# Patient Record
Sex: MALE | Race: WHITE | NOT HISPANIC OR LATINO
[De-identification: names, ages, dates, MRNs, and addresses within clinical notes are randomized per-mention and may not be internally consistent; named-entity substitution may affect disease eponyms.]

---

## 2017-01-17 ENCOUNTER — APPOINTMENT (OUTPATIENT)
Dept: SURGERY | Facility: CLINIC | Age: 67
End: 2017-01-17

## 2017-01-17 ENCOUNTER — LABORATORY RESULT (OUTPATIENT)
Age: 67
End: 2017-01-17

## 2017-01-17 DIAGNOSIS — R22.0 LOCALIZED SWELLING, MASS AND LUMP, HEAD: ICD-10-CM

## 2017-01-18 ENCOUNTER — TRANSCRIPTION ENCOUNTER (OUTPATIENT)
Age: 67
End: 2017-01-18

## 2017-01-23 ENCOUNTER — OTHER (OUTPATIENT)
Age: 67
End: 2017-01-23

## 2022-04-05 ENCOUNTER — APPOINTMENT (OUTPATIENT)
Dept: ORTHOPEDIC SURGERY | Facility: CLINIC | Age: 72
End: 2022-04-05
Payer: MEDICARE

## 2022-04-05 VITALS — WEIGHT: 213 LBS | BODY MASS INDEX: 30.49 KG/M2 | HEIGHT: 70 IN

## 2022-04-05 PROCEDURE — 99214 OFFICE O/P EST MOD 30 MIN: CPT

## 2022-04-05 NOTE — ASSESSMENT
[FreeTextEntry1] : recurrent right knee pain since early feb 2022. mild oa only. saw dr aponte and received csi on 2/28/22. aspirate shows pseudogout. mri shows large mmt, lmt. continued pain and buckling.\par \par   history of left knee scope done by me in 2010. doing okay at this time.\par  \par  *pmhx: htn, high cholesterol, thyroid dz, DM history of heart disease - s/p coronary bypass

## 2022-04-05 NOTE — REASON FOR VISIT
[FreeTextEntry2] : pt is here for a follow up visit  of his right knee. the pain in the right knee has improve. pt still taking medication for the right knee.

## 2022-04-05 NOTE — PHYSICAL EXAM
[Right] : right knee [NL (0)] : extension 0 degrees [4___] : hamstring 4[unfilled]/5 [Positive] : positive Iris [] : patient ambulates with assistive device [TWNoteComboBox7] : flexion 110 degrees

## 2022-04-05 NOTE — HISTORY OF PRESENT ILLNESS
[Gradual] : gradual [5] : 5 [6] : 6 [Intermittent] : intermittent [Rest] : rest [Sitting] : sitting [Bending forward] : bending forward [Retired] : Work status: retired [de-identified] : 4/5/22:  continued right medial knee pain with activities.  [] : no [FreeTextEntry1] : right knee

## 2022-04-18 ENCOUNTER — APPOINTMENT (OUTPATIENT)
Dept: ORTHOPEDIC SURGERY | Facility: AMBULATORY SURGERY CENTER | Age: 72
End: 2022-04-18
Payer: MEDICARE

## 2022-04-18 PROCEDURE — 29880 ARTHRS KNE SRG MNISECTMY M&L: CPT | Mod: RT

## 2022-04-18 RX ORDER — ASPIRIN/ACETAMINOPHEN/CAFFEINE 500-325-65
325 POWDER IN PACKET (EA) ORAL DAILY
Qty: 30 | Refills: 0 | Status: ACTIVE | COMMUNITY
Start: 2022-04-18 | End: 1900-01-01

## 2022-04-18 RX ORDER — OXYCODONE AND ACETAMINOPHEN 5; 325 MG/1; MG/1
5-325 TABLET ORAL EVERY 4 HOURS
Qty: 30 | Refills: 0 | Status: ACTIVE | COMMUNITY
Start: 2022-04-18 | End: 1900-01-01

## 2022-04-29 ENCOUNTER — APPOINTMENT (OUTPATIENT)
Dept: ORTHOPEDIC SURGERY | Facility: CLINIC | Age: 72
End: 2022-04-29
Payer: MEDICARE

## 2022-04-29 VITALS — HEIGHT: 70 IN | WEIGHT: 213 LBS | BODY MASS INDEX: 30.49 KG/M2

## 2022-04-29 DIAGNOSIS — Z86.79 PERSONAL HISTORY OF OTHER DISEASES OF THE CIRCULATORY SYSTEM: ICD-10-CM

## 2022-04-29 DIAGNOSIS — Z86.39 PERSONAL HISTORY OF OTHER ENDOCRINE, NUTRITIONAL AND METABOLIC DISEASE: ICD-10-CM

## 2022-04-29 PROCEDURE — 99024 POSTOP FOLLOW-UP VISIT: CPT

## 2022-04-29 RX ORDER — METOPROLOL TARTRATE 100 MG/1
100 TABLET, FILM COATED ORAL
Refills: 0 | Status: ACTIVE | COMMUNITY

## 2022-04-29 RX ORDER — ROSUVASTATIN CALCIUM 20 MG/1
20 TABLET, FILM COATED ORAL
Refills: 0 | Status: ACTIVE | COMMUNITY

## 2022-04-29 NOTE — ASSESSMENT
[FreeTextEntry1] : s/p right knee scope for pmm, plm, synovectomy, chondroplasty on 4/18/22.  oa present.  doing well.

## 2022-04-29 NOTE — DISCUSSION/SUMMARY
[de-identified] : Instructions:  Progress Note completed by Tiffanie Herrera PA-C\par * Dr. Brink -- The documentation recorded accurately reflects the decisions made by me during this visit.

## 2022-04-29 NOTE — HISTORY OF PRESENT ILLNESS
[3] : 3 [Tightness] : tightness [Retired] : Work status: retired [] : Post Surgical Visit: yes [de-identified] : 4/29/22:  s/p right knee scope on 4/18/22.  [FreeTextEntry1] : right knee [FreeTextEntry6] : numbness [de-identified] : p [de-identified] : 04/11/22

## 2022-05-25 ENCOUNTER — RX RENEWAL (OUTPATIENT)
Age: 72
End: 2022-05-25

## 2022-05-25 RX ORDER — INDOMETHACIN 50 MG/1
50 CAPSULE ORAL
Qty: 60 | Refills: 1 | Status: ACTIVE | COMMUNITY
Start: 2022-05-25 | End: 1900-01-01

## 2022-06-10 ENCOUNTER — APPOINTMENT (OUTPATIENT)
Dept: ORTHOPEDIC SURGERY | Facility: CLINIC | Age: 72
End: 2022-06-10
Payer: MEDICARE

## 2022-06-10 VITALS — WEIGHT: 213 LBS | HEIGHT: 70 IN | BODY MASS INDEX: 30.49 KG/M2

## 2022-06-10 PROCEDURE — 99024 POSTOP FOLLOW-UP VISIT: CPT

## 2022-06-10 RX ORDER — OXYCODONE AND ACETAMINOPHEN 5; 325 MG/1; MG/1
5-325 TABLET ORAL TWICE DAILY
Qty: 14 | Refills: 0 | Status: ACTIVE | COMMUNITY
Start: 2022-06-10 | End: 1900-01-01

## 2022-06-10 NOTE — DISCUSSION/SUMMARY
[de-identified] : Instructions:  Progress Note completed by Tiffanie Herrera PA-C\par * Dr. Brink -- The documentation recorded accurately reflects the decisions made by me during this visit.

## 2022-06-10 NOTE — REASON FOR VISIT
[FreeTextEntry2] : post op right knee Pain Refusal Text: I offered to prescribe pain medication but the patient refused to take this medication.

## 2022-06-10 NOTE — HISTORY OF PRESENT ILLNESS
[6] : 6 [5] : 5 [] : Post Surgical Visit: yes [de-identified] : 4/29/22:  s/p right knee scope on 4/18/22.\par 6/10/22:  right knee pain a little worse.  [FreeTextEntry1] : right knee [de-identified] : 4/18/22 [de-identified] : right knee

## 2022-07-15 ENCOUNTER — APPOINTMENT (OUTPATIENT)
Dept: ORTHOPEDIC SURGERY | Facility: CLINIC | Age: 72
End: 2022-07-15

## 2022-07-15 VITALS — WEIGHT: 213 LBS | BODY MASS INDEX: 30.49 KG/M2 | HEIGHT: 70 IN

## 2022-07-15 DIAGNOSIS — S83.241D OTHER TEAR OF MEDIAL MENISCUS, CURRENT INJURY, RIGHT KNEE, SUBSEQUENT ENCOUNTER: ICD-10-CM

## 2022-07-15 DIAGNOSIS — S83.281D OTHER TEAR OF LATERAL MENISCUS, CURRENT INJURY, RIGHT KNEE, SUBSEQUENT ENCOUNTER: ICD-10-CM

## 2022-07-15 PROCEDURE — 99024 POSTOP FOLLOW-UP VISIT: CPT

## 2022-07-15 PROCEDURE — 20610 DRAIN/INJ JOINT/BURSA W/O US: CPT | Mod: 58,RT

## 2022-07-15 PROCEDURE — 99214 OFFICE O/P EST MOD 30 MIN: CPT | Mod: 25

## 2022-07-15 NOTE — PHYSICAL EXAM
[Right] : right knee [NL (0)] : extension 0 degrees [4___] : quadriceps 4[unfilled]/5 [5___] : hamstring 5[unfilled]/5 [] : no drainage [de-identified] : nvi distally [TWNoteComboBox7] : flexion 120 degrees

## 2022-07-15 NOTE — PROCEDURE
[Large Joint Injection] : Large joint injection [Right] : of the right [Knee] : knee [Pain] : pain [Inflammation] : inflammation [Alcohol] : alcohol [Betadine] : betadine [Orthovisc] : Orthovisc [#1] : series #1 [] : Patient tolerated procedure well [Call if redness, pain or fever occur] : call if redness, pain or fever occur [Apply ice for 15min out of every hour for the next 12-24 hours as tolerated] : apply ice for 15 minutes out of every hour for the next 12-24 hours as tolerated [Patient was advised to rest the joint(s) for ____ days] : patient was advised to rest the joint(s) for [unfilled] days [Previous OTC use and PT nontherapeutic] : patient has tried OTC's including aspirin, Ibuprofen, Aleve, etc or prescription NSAIDS, and/or exercises at home and/or physical therapy without satisfactory response [Patient had decreased mobility in the joint] : patient had decreased mobility in the joint [Risks, benefits, alternatives discussed / Verbal consent obtained] : the risks benefits, and alternatives have been discussed, and verbal consent was obtained

## 2022-07-15 NOTE — HISTORY OF PRESENT ILLNESS
[Localized] : localized [Sharp] : sharp [Throbbing] : throbbing [Tightness] : tightness [Intermittent] : intermittent [6] : 6 [5] : 5 [de-identified] : 4/29/22:  s/p right knee scope on 4/18/22.\par 6/10/22:  right knee pain a little worse. \par 7/15/22:  right knee pain persists. [] : no [FreeTextEntry1] : right knee [FreeTextEntry5] : post op right knee follow up. Pt states pain is less often but when it is painful it feels worse then previously. Pt states that his knee buckled and gave out on him on 7/14/22. [de-identified] : physical therapy 2 times a week [de-identified] : 4/18/22 [de-identified] : right knee

## 2022-07-22 ENCOUNTER — APPOINTMENT (OUTPATIENT)
Dept: ORTHOPEDIC SURGERY | Facility: CLINIC | Age: 72
End: 2022-07-22

## 2022-07-22 PROCEDURE — 99212 OFFICE O/P EST SF 10 MIN: CPT | Mod: 25

## 2022-07-22 PROCEDURE — 20610 DRAIN/INJ JOINT/BURSA W/O US: CPT

## 2022-07-22 NOTE — ASSESSMENT
[FreeTextEntry1] : s/p right knee scope for pmm, plm, synovectomy, chondroplasty on 4/18/22.  oa present.  some increased pain last few days. no fevers and chills.

## 2022-07-22 NOTE — PROCEDURE
[FreeTextEntry3] : Procedure Name: ORTHOVISC (Large Joint)\par \par Viscosupplementation Injection: X-ray evidence of Osteoarthritis on this or prior visit and Patient has tried OTC's including aspirin, Ibuprofen, Aleve etc or prescription NSAIDS, and/or exercises at home and/ or physical therapy without satisfactory response.  The risks, benefits, and alternatives to Viscosupplementation injection were explained in full to the patient. Risks outlined include but are not limited to infection, sepsis, bleeding, scarring, skin discoloration, temporary increase in pain, syncopal episode, failure to resolve symptoms, allergic reaction, and symptom recurrence. Signs and symptoms of infection reviewed and patient advised to call immediately for redness, fevers, and/or chills. Patient understood the risks. All questions were answered. \par \par Oral informed consent was obtained.   Sterile technique was utilized for the procedure including the preparation of the solutions used for the injection. An injection of Orthovisc 2ml #2 was injected into RIGHT KNEE.  Patient tolerated the procedure well. Advised to ice the injection site this evening.  Post Procedure Instructions: Patient was advised to call if redness, pain, or fever occur and apply ice for 15 min. out of every hour for the next 12-24 hours as tolerated. patient was advised to rest the joint(s) for 2 days.

## 2022-07-22 NOTE — HISTORY OF PRESENT ILLNESS
[2] : 2 [Orthovisc] : Orthovisc [de-identified] : 4/29/22:  s/p right knee scope on 4/18/22.\par 6/10/22:  right knee pain a little worse. \par 7/15/22:  right knee pain persists.\par 7/22/22:  right knee pain still.  no adverse reactions from first injection.  [] : no [FreeTextEntry1] : rt knee [de-identified] : 6/22/22 [TWNoteComboBox1] : 40%

## 2022-07-22 NOTE — DISCUSSION/SUMMARY
[de-identified] : Progress note completed by Tiffanie Herrera PA-C\par *Dr. Brink - [facility] assigned on this date saw this patient independently.  I have reviewed the note and agree with the treatment provided.

## 2022-07-22 NOTE — PHYSICAL EXAM
[Right] : right knee [NL (0)] : extension 0 degrees [4___] : quadriceps 4[unfilled]/5 [5___] : hamstring 5[unfilled]/5 [] : no drainage [de-identified] : nvi distally [TWNoteComboBox7] : flexion 120 degrees

## 2022-07-28 ENCOUNTER — APPOINTMENT (OUTPATIENT)
Dept: ORTHOPEDIC SURGERY | Facility: CLINIC | Age: 72
End: 2022-07-28

## 2022-07-28 VITALS — BODY MASS INDEX: 30.49 KG/M2 | WEIGHT: 213 LBS | HEIGHT: 70 IN

## 2022-07-28 PROCEDURE — 99212 OFFICE O/P EST SF 10 MIN: CPT | Mod: 25

## 2022-07-28 PROCEDURE — 20610 DRAIN/INJ JOINT/BURSA W/O US: CPT

## 2022-07-28 NOTE — PHYSICAL EXAM
[Right] : right knee [NL (0)] : extension 0 degrees [4___] : quadriceps 4[unfilled]/5 [5___] : hamstring 5[unfilled]/5 [] : light touch is intact throughout [de-identified] : nvi distally [TWNoteComboBox7] : flexion 120 degrees

## 2022-07-28 NOTE — REASON FOR VISIT
[FreeTextEntry2] : Patient is here for a Follow Up appointment to receive Orthovisc Injection #3 in the Right Knee.

## 2022-07-28 NOTE — PROCEDURE
[FreeTextEntry3] : Procedure Name: ORTHOVISC (Large Joint)\par \par Viscosupplementation Injection: X-ray evidence of Osteoarthritis on this or prior visit and Patient has tried OTC's including aspirin, Ibuprofen, Aleve etc or prescription NSAIDS, and/or exercises at home and/ or physical therapy without satisfactory response.  The risks, benefits, and alternatives to Viscosupplementation injection were explained in full to the patient. Risks outlined include but are not limited to infection, sepsis, bleeding, scarring, skin discoloration, temporary increase in pain, syncopal episode, failure to resolve symptoms, allergic reaction, and symptom recurrence. Signs and symptoms of infection reviewed and patient advised to call immediately for redness, fevers, and/or chills. Patient understood the risks. All questions were answered. \par \par Oral informed consent was obtained.   Sterile technique was utilized for the procedure including the preparation of the solutions used for the injection. An injection of Orthovisc 2ml #3 was injected into RIGHT KNEE.  Patient tolerated the procedure well. Advised to ice the injection site this evening.  Post Procedure Instructions: Patient was advised to call if redness, pain, or fever occur and apply ice for 15 min. out of every hour for the next 12-24 hours as tolerated. patient was advised to rest the joint(s) for 2 days.

## 2022-07-28 NOTE — HISTORY OF PRESENT ILLNESS
[3] : 3 [Orthovisc] : Orthovisc [de-identified] : 4/29/22:  s/p right knee scope on 4/18/22.\par 6/10/22:  right knee pain a little worse. \par 7/15/22:  right knee pain persists.\par 7/22/22:  right knee pain still.  no adverse reactions from first injection. \par 7/28/22:  right knee pain continues.  [] : no [FreeTextEntry1] : right knee [de-identified] : 7/22/22 [TWNoteComboBox1] : 40%

## 2022-07-28 NOTE — ASSESSMENT
[FreeTextEntry1] : s/p right knee scope for pmm, plm, synovectomy, chondroplasty on 4/18/22.  oa present.

## 2022-07-28 NOTE — DISCUSSION/SUMMARY
[de-identified] : Progress note completed by Tiffanie Herrera PA-C\par *Dr. Brink - [facility] assigned on this date saw this patient independently.  I have reviewed the note and agree with the treatment provided.

## 2022-08-05 ENCOUNTER — APPOINTMENT (OUTPATIENT)
Dept: ORTHOPEDIC SURGERY | Facility: CLINIC | Age: 72
End: 2022-08-05

## 2022-08-05 VITALS — WEIGHT: 213 LBS | BODY MASS INDEX: 30.49 KG/M2 | HEIGHT: 70 IN

## 2022-08-05 PROCEDURE — 20610 DRAIN/INJ JOINT/BURSA W/O US: CPT | Mod: 50

## 2022-08-05 PROCEDURE — 73562 X-RAY EXAM OF KNEE 3: CPT | Mod: LT

## 2022-08-05 PROCEDURE — 99212 OFFICE O/P EST SF 10 MIN: CPT | Mod: 25

## 2022-08-05 NOTE — HISTORY OF PRESENT ILLNESS
[Orthovisc] : Orthovisc [3] : 3 [de-identified] : 4/29/22:  s/p right knee scope on 4/18/22.\par 6/10/22:  right knee pain a little worse. \par 7/15/22:  right knee pain persists.\par 7/22/22:  right knee pain still.  no adverse reactions from first injection. \par 7/28/22:  right knee pain continues. \par 8/5/22: follow up right knee. left knee now with some pain. [] : no [FreeTextEntry1] : right knee [de-identified] : 7/22/22 [TWNoteComboBox1] : 40%

## 2022-08-05 NOTE — PROCEDURE
[FreeTextEntry3] : Procedure Name: ORTHOVISC (Large Joint)\par \par Viscosupplementation Injection: X-ray evidence of Osteoarthritis on this or prior visit and Patient has tried OTC's including aspirin, Ibuprofen, Aleve etc or prescription NSAIDS, and/or exercises at home and/ or physical therapy without satisfactory response.  The risks, benefits, and alternatives to Viscosupplementation injection were explained in full to the patient. Risks outlined include but are not limited to infection, sepsis, bleeding, scarring, skin discoloration, temporary increase in pain, syncopal episode, failure to resolve symptoms, allergic reaction, and symptom recurrence. Signs and symptoms of infection reviewed and patient advised to call immediately for redness, fevers, and/or chills. Patient understood the risks. All questions were answered. \par \par Oral informed consent was obtained.   Sterile technique was utilized for the procedure including the preparation of the solutions used for the injection. An injection of Orthovisc 2ml #4 was injected into bilateral KNEEs.Orthovisc 2 ml #1 left knee  Patient tolerated the procedure well. Advised to ice the injection site this evening.  Post Procedure Instructions: Patient was advised to call if redness, pain, or fever occur and apply ice for 15 min. out of every hour for the next 12-24 hours as tolerated. patient was advised to rest the joint(s) for 2 days.

## 2022-08-05 NOTE — ASSESSMENT
[FreeTextEntry1] : s/p right knee scope for pmm, plm, synovectomy, chondroplasty on 4/18/22.  oa present. \par \par left knee pain with oa.

## 2022-08-05 NOTE — PHYSICAL EXAM
[Right] : right knee [NL (0)] : extension 0 degrees [4___] : quadriceps 4[unfilled]/5 [5___] : hamstring 5[unfilled]/5 [Left] : left knee [Degenerative change] : Degenerative change [] : no drainage [de-identified] : nvi distally [TWNoteComboBox7] : flexion 120 degrees

## 2022-08-12 ENCOUNTER — APPOINTMENT (OUTPATIENT)
Dept: ORTHOPEDIC SURGERY | Facility: CLINIC | Age: 72
End: 2022-08-12

## 2022-08-12 VITALS — WEIGHT: 213 LBS | BODY MASS INDEX: 30.49 KG/M2 | HEIGHT: 70 IN

## 2022-08-12 PROCEDURE — 99212 OFFICE O/P EST SF 10 MIN: CPT | Mod: 25

## 2022-08-12 PROCEDURE — 20611 DRAIN/INJ JOINT/BURSA W/US: CPT | Mod: LT

## 2022-08-12 NOTE — HISTORY OF PRESENT ILLNESS
[2] : 2 [Orthovisc] : Orthovisc [de-identified] : 4/29/22:  s/p right knee scope on 4/18/22.\par 6/10/22:  right knee pain a little worse. \par 7/15/22:  right knee pain persists.\par 7/22/22:  right knee pain still.  no adverse reactions from first injection. \par 7/28/22:  right knee pain continues. \par 8/5/22: follow up right knee. left knee now with some pain.\par 8/12/22:  left knee pain.  no adverse reactions from first injection.  [] : no [de-identified] : 08/05/22

## 2022-08-12 NOTE — PROCEDURE
[FreeTextEntry3] : Procedure Name: Orthovisc (Large Joint) with Ultrasound Guidance\par \par Viscosupplementation Injection: X-ray evidence of Osteoarthritis on this or prior visit and Patient has tried OTC's including aspirin, Ibuprofen, Aleve etc or prescription NSAIDS, and/or exercises at home and/ or physical therapy without satisfactory response.  The risks, benefits, and alternatives to Viscosupplementation injection were explained in full to the patient. Risks outlined include but are not limited to infection, sepsis, bleeding, scarring, skin discoloration, temporary increase in pain, syncopal episode, failure to resolve symptoms, allergic reaction, and symptom recurrence. Signs and symptoms of infection reviewed and patient advised to call immediately for redness, fevers, and/or chills. Patient understood the risks. All questions were answered. \par \par Oral informed consent was obtained.   Sterile technique was utilized for the procedure including the preparation of the solutions used for the injection. An injection of Orthovisc 2ml #2 was injected into LEFT KNEE.  Patient tolerated the procedure well. Advised to ice the injection site this evening.  Post Procedure Instructions: Patient was advised to call if redness, pain, or fever occur and apply ice for 15 min. out of every hour for the next 12-24 hours as tolerated. patient was advised to rest the joint(s) for 2 days. \par \par Ultrasound Extremity (09916) was used because of the following reasons: inflammation.\par Ultrasound Guidance was used for the following reasons: for accurate placement of needle into knee joint\par Diagnostic ultrasound was performed of the knee and is positive for synovitis.\par Ultrasound guided injection was performed of the knee, visualization of the needle and placement of injection was performed without complication.

## 2022-08-12 NOTE — PHYSICAL EXAM
[Right] : right knee [NL (0)] : extension 0 degrees [4___] : quadriceps 4[unfilled]/5 [5___] : hamstring 5[unfilled]/5 [] : antalgic [Left] : left knee [Degenerative change] : Degenerative change [de-identified] : nvi distally [TWNoteComboBox7] : flexion 120 degrees

## 2022-08-12 NOTE — DISCUSSION/SUMMARY
[de-identified] : Progress note completed by Tiffanie Herrera PA-C\par *Dr. Brink - [facility] assigned on this date saw this patient independently.  I have reviewed the note and agree with the treatment provided.

## 2022-08-12 NOTE — ASSESSMENT
[FreeTextEntry1] : s/p right knee scope for pmm, plm, synovectomy, chondroplasty on 4/18/22.  oa present.   finished orthovisc on 8/5/22. \par \par left knee pain with oa.

## 2022-08-19 ENCOUNTER — APPOINTMENT (OUTPATIENT)
Dept: ORTHOPEDIC SURGERY | Facility: CLINIC | Age: 72
End: 2022-08-19

## 2022-08-26 ENCOUNTER — APPOINTMENT (OUTPATIENT)
Dept: ORTHOPEDIC SURGERY | Facility: CLINIC | Age: 72
End: 2022-08-26

## 2022-08-26 VITALS — HEIGHT: 70 IN | WEIGHT: 213 LBS | BODY MASS INDEX: 30.49 KG/M2

## 2022-08-26 PROCEDURE — 20610 DRAIN/INJ JOINT/BURSA W/O US: CPT | Mod: LT

## 2022-08-26 PROCEDURE — 99212 OFFICE O/P EST SF 10 MIN: CPT | Mod: 25

## 2022-08-26 NOTE — DISCUSSION/SUMMARY
[de-identified] : Progress note completed by Tiffanie Herrera PA-C\par *Dr. Brink - [facility] assigned on this date saw this patient independently.  I have reviewed the note and agree with the treatment provided.

## 2022-08-26 NOTE — PROCEDURE
[FreeTextEntry3] : Procedure Name: Orthovisc (Large Joint) with Ultrasound Guidance\par \par Viscosupplementation Injection: X-ray evidence of Osteoarthritis on this or prior visit and Patient has tried OTC's including aspirin, Ibuprofen, Aleve etc or prescription NSAIDS, and/or exercises at home and/ or physical therapy without satisfactory response.  The risks, benefits, and alternatives to Viscosupplementation injection were explained in full to the patient. Risks outlined include but are not limited to infection, sepsis, bleeding, scarring, skin discoloration, temporary increase in pain, syncopal episode, failure to resolve symptoms, allergic reaction, and symptom recurrence. Signs and symptoms of infection reviewed and patient advised to call immediately for redness, fevers, and/or chills. Patient understood the risks. All questions were answered. \par \par Oral informed consent was obtained.   Sterile technique was utilized for the procedure including the preparation of the solutions used for the injection. An injection of Orthovisc 2ml #3 was injected into LEFT KNEE.  Patient tolerated the procedure well. Advised to ice the injection site this evening.  Post Procedure Instructions: Patient was advised to call if redness, pain, or fever occur and apply ice for 15 min. out of every hour for the next 12-24 hours as tolerated. patient was advised to rest the joint(s) for 2 days. \par \par Ultrasound Extremity (32060) was used because of the following reasons: inflammation.\par Ultrasound Guidance was used for the following reasons: for accurate placement of needle into knee joint\par Diagnostic ultrasound was performed of the knee and is positive for synovitis.\par Ultrasound guided injection was performed of the knee, visualization of the needle and placement of injection was performed without complication.

## 2022-08-26 NOTE — HISTORY OF PRESENT ILLNESS
[3] : 3 [Orthovisc] : Orthovisc [de-identified] : 4/29/22:  s/p right knee scope on 4/18/22.\par 6/10/22:  right knee pain a little worse. \par 7/15/22:  right knee pain persists.\par 7/22/22:  right knee pain still.  no adverse reactions from first injection. \par 7/28/22:  right knee pain continues. \par 8/5/22: follow up right knee. left knee now with some pain.\par 8/12/22:  left knee pain.  no adverse reactions from first injection. \par 8/26/22:  left knee pain continues.  [] : no [FreeTextEntry1] : left knee [de-identified] : 08/12/22 [TWNoteComboBox1] : 90%

## 2022-08-26 NOTE — PHYSICAL EXAM
[Right] : right knee [NL (0)] : extension 0 degrees [4___] : quadriceps 4[unfilled]/5 [5___] : hamstring 5[unfilled]/5 [] : antalgic [Left] : left knee [Degenerative change] : Degenerative change [de-identified] : nvi distally [TWNoteComboBox7] : flexion 120 degrees

## 2022-09-02 ENCOUNTER — APPOINTMENT (OUTPATIENT)
Dept: ORTHOPEDIC SURGERY | Facility: CLINIC | Age: 72
End: 2022-09-02

## 2022-09-02 VITALS — WEIGHT: 213 LBS | HEIGHT: 70 IN | BODY MASS INDEX: 30.49 KG/M2

## 2022-09-02 PROCEDURE — 99212 OFFICE O/P EST SF 10 MIN: CPT | Mod: 25

## 2022-09-02 PROCEDURE — 20611 DRAIN/INJ JOINT/BURSA W/US: CPT | Mod: LT

## 2022-09-02 NOTE — HISTORY OF PRESENT ILLNESS
[4] : 4 [Orthovisc] : Orthovisc [de-identified] : 4/29/22:  s/p right knee scope on 4/18/22.\par 6/10/22:  right knee pain a little worse. \par 7/15/22:  right knee pain persists.\par 7/22/22:  right knee pain still.  no adverse reactions from first injection. \par 7/28/22:  right knee pain continues. \par 8/5/22: follow up right knee. left knee now with some pain.\par 8/12/22:  left knee pain.  no adverse reactions from first injection. \par 8/26/22:  left knee pain continues. \par 9/2/22:  left knee pain still.  [] : no [FreeTextEntry1] : left knee [de-identified] : 08/26/22 [TWNoteComboBox1] : 80%

## 2022-09-02 NOTE — PROCEDURE
[FreeTextEntry3] : Procedure Name: Orthovisc (Large Joint) with Ultrasound Guidance\par \par Viscosupplementation Injection: X-ray evidence of Osteoarthritis on this or prior visit and Patient has tried OTC's including aspirin, Ibuprofen, Aleve etc or prescription NSAIDS, and/or exercises at home and/ or physical therapy without satisfactory response.  The risks, benefits, and alternatives to Viscosupplementation injection were explained in full to the patient. Risks outlined include but are not limited to infection, sepsis, bleeding, scarring, skin discoloration, temporary increase in pain, syncopal episode, failure to resolve symptoms, allergic reaction, and symptom recurrence. Signs and symptoms of infection reviewed and patient advised to call immediately for redness, fevers, and/or chills. Patient understood the risks. All questions were answered. \par \par Oral informed consent was obtained.   Sterile technique was utilized for the procedure including the preparation of the solutions used for the injection. An injection of Orthovisc 2ml #4 was injected into LEFT KNEE.  Patient tolerated the procedure well. Advised to ice the injection site this evening.  Post Procedure Instructions: Patient was advised to call if redness, pain, or fever occur and apply ice for 15 min. out of every hour for the next 12-24 hours as tolerated. patient was advised to rest the joint(s) for 2 days. \par \par Ultrasound Extremity (05732) was used because of the following reasons: inflammation.\par Ultrasound Guidance was used for the following reasons: for accurate placement of needle into knee joint\par Diagnostic ultrasound was performed of the knee and is positive for synovitis.\par Ultrasound guided injection was performed of the knee, visualization of the needle and placement of injection was performed without complication.

## 2022-09-02 NOTE — DISCUSSION/SUMMARY
[de-identified] : Progress note completed by Tiffanie Herrera PA-C\par *Dr. Brink - [facility] assigned on this date saw this patient independently.  I have reviewed the note and agree with the treatment provided.

## 2022-09-02 NOTE — PHYSICAL EXAM
[Right] : right knee [NL (0)] : extension 0 degrees [4___] : quadriceps 4[unfilled]/5 [5___] : hamstring 5[unfilled]/5 [Left] : left knee [Degenerative change] : Degenerative change [] : no drainage [de-identified] : nvi distally [TWNoteComboBox7] : flexion 120 degrees

## 2023-07-13 ENCOUNTER — APPOINTMENT (OUTPATIENT)
Dept: ORTHOPEDIC SURGERY | Facility: CLINIC | Age: 73
End: 2023-07-13
Payer: MEDICARE

## 2023-07-13 VITALS — BODY MASS INDEX: 30.49 KG/M2 | WEIGHT: 213 LBS | HEIGHT: 70 IN

## 2023-07-13 DIAGNOSIS — S16.1XXA STRAIN OF MUSCLE, FASCIA AND TENDON AT NECK LEVEL, INITIAL ENCOUNTER: ICD-10-CM

## 2023-07-13 DIAGNOSIS — M62.838 OTHER MUSCLE SPASM: ICD-10-CM

## 2023-07-13 PROCEDURE — 73562 X-RAY EXAM OF KNEE 3: CPT | Mod: 50

## 2023-07-13 PROCEDURE — 20610 DRAIN/INJ JOINT/BURSA W/O US: CPT | Mod: 59,LT

## 2023-07-13 PROCEDURE — J3490M: CUSTOM | Mod: NC

## 2023-07-13 PROCEDURE — 73010 X-RAY EXAM OF SHOULDER BLADE: CPT | Mod: LT

## 2023-07-13 PROCEDURE — 73030 X-RAY EXAM OF SHOULDER: CPT | Mod: LT

## 2023-07-13 PROCEDURE — 99214 OFFICE O/P EST MOD 30 MIN: CPT | Mod: 25

## 2023-07-13 PROCEDURE — 72040 X-RAY EXAM NECK SPINE 2-3 VW: CPT

## 2023-07-13 NOTE — DISCUSSION/SUMMARY
[de-identified] : Progress Note completed by Tiffanie Herrera PA-C\par * Dr. Brink -- The documentation recorded in this note accurately reflects the decisions made by me during this visit.

## 2023-07-13 NOTE — HISTORY OF PRESENT ILLNESS
[10] : 10 [7] : 7 [Dull/Aching] : dull/aching [Radiating] : radiating [Shooting] : shooting [Throbbing] : throbbing [Constant] : constant [de-identified] : 7/13/23:  new onset of left shoulder, shoulder blade and neck pain since 7/8/23 after hoisting meaghan.  also recurrent bilateral knee pain.  no new injury.  [] : no [FreeTextEntry1] : bilateral knee, left shoulder  [FreeTextEntry7] : from the armpit down for the left shoulder  [FreeTextEntry9] : gel injections

## 2023-07-13 NOTE — PROCEDURE
[FreeTextEntry3] : Procedure Name: Large Joint Injection / Aspiration: Celestone, Lidocaine and Marcaine \par \par Large Joint Injection was performed because of pain and inflammation. Anesthesia: ethyl chloride sprayed topically.\par Celestone: An injection of Celestone 6 mg , 1 cc. \par Lidocaine 1%: 3 cc.\par Marcaine 0.25%:  3 cc.\par \par Patient has tried OTC's including aspirin, Ibuprofen, Aleve etc or prescription NSAIDS, and/or exercises at home and/ or physical therapy without satisfactory response and Patient has decreased mobility in the joint. The risks, benefits, and alternatives to cortisone injection were explained in full to the patient. Risks outlined include but are not limited to infection, sepsis, bleeding, scarring, skin discoloration, temporary increase in pain, syncopal episode, failure to resolve symptoms, allergic reaction, symptom recurrence, and elevation of blood sugar in diabetics. Patient understood the risks. All questions were answered.   Oral informed consent was obtained.   Sterile technique was utilized for the procedure including the preparation of the solutions used for the injection. Medication was injected into the LEFT SUBACROMIAL SPACE.   Patient tolerated the procedure well. Advised to ice the injection site this evening.  Post Procedure Instructions: Patient was advised to call if redness, pain, or fever occur and apply ice for 15 min. out of every hour for the next 12-24 hours as tolerated. patient was advised to rest the joint(s) for 2 days. \par \par Procedure Name: ORTHOVISC (Large Joint)\par \par Viscosupplementation Injection: X-ray evidence of Osteoarthritis on this or prior visit and Patient has tried OTC's including aspirin, Ibuprofen, Aleve etc or prescription NSAIDS, and/or exercises at home and/ or physical therapy without satisfactory response.  The risks, benefits, and alternatives to Viscosupplementation injection were explained in full to the patient. Risks outlined include but are not limited to infection, sepsis, bleeding, scarring, skin discoloration, temporary increase in pain, syncopal episode, failure to resolve symptoms, allergic reaction, and symptom recurrence. Signs and symptoms of infection reviewed and patient advised to call immediately for redness, fevers, and/or chills. Patient understood the risks. All questions were answered. \par \par Oral informed consent was obtained.   Sterile technique was utilized for the procedure including the preparation of the solutions used for the injection. An injection of Orthovisc 2ml #1 was injected into BILATERAL KNEES.  Patient tolerated the procedure well. Advised to ice the injection site this evening.  Post Procedure Instructions: Patient was advised to call if redness, pain, or fever occur and apply ice for 15 min. out of every hour for the next 12-24 hours as tolerated. patient was advised to rest the joint(s) for 2 days.

## 2023-07-13 NOTE — ASSESSMENT
[FreeTextEntry1] : new onset of left shoulder, shoulder blade and neck pain since 7/8/23 after hoisting meaghan.   posterior shoulder pain.  likely bursitis, cuff tendonitis.  \par \par also neck stiffness with left trap pain.  occ left arm radicular symptoms.  likely muscular. \par \par s/p right knee scope for pmm, plm, synovectomy, chondroplasty on 4/18/22.  oa present.   finished orthovisc on 8/5/22. recurrent pain.  csi on 9/2/23. \par \par left knee pain with oa.  recurrent pain.  csi on 9/2/23.

## 2023-07-13 NOTE — PHYSICAL EXAM
[Right] : right knee [NL (0)] : extension 0 degrees [5___] : hamstring 5[unfilled]/5 [4 ___] : forward flexion 4[unfilled]/5 [4___] : internal rotation 4[unfilled]/5 [Equivocal] : equivocal Iris [Facet arthropathy] : Facet arthropathy [Disc space narrowing] : Disc space narrowing [There are no fractures, subluxations or dislocations. No significant abnormalities are seen] : There are no fractures, subluxations or dislocations. No significant abnormalities are seen [FreeTextEntry8] : ttp scapula [de-identified] : active abduction 140 degrees [TWNoteComboBox6] : internal rotation L5 [de-identified] : external rotation 50 degrees [] : no erythema [Left] : left knee [Degenerative change] : Degenerative change [de-identified] : nvi distally [TWNoteComboBox7] : flexion 120 degrees

## 2023-07-20 ENCOUNTER — APPOINTMENT (OUTPATIENT)
Dept: ORTHOPEDIC SURGERY | Facility: CLINIC | Age: 73
End: 2023-07-20
Payer: MEDICARE

## 2023-07-20 VITALS — BODY MASS INDEX: 30.49 KG/M2 | WEIGHT: 213 LBS | HEIGHT: 70 IN

## 2023-07-20 DIAGNOSIS — S46.912A STRAIN OF UNSPECIFIED MUSCLE, FASCIA AND TENDON AT SHOULDER AND UPPER ARM LEVEL, LEFT ARM, INITIAL ENCOUNTER: ICD-10-CM

## 2023-07-20 PROCEDURE — 99212 OFFICE O/P EST SF 10 MIN: CPT | Mod: 25

## 2023-07-20 PROCEDURE — 20610 DRAIN/INJ JOINT/BURSA W/O US: CPT | Mod: 50

## 2023-07-20 NOTE — PHYSICAL EXAM
[Facet arthropathy] : Facet arthropathy [Disc space narrowing] : Disc space narrowing [4 ___] : forward flexion 4[unfilled]/5 [There are no fractures, subluxations or dislocations. No significant abnormalities are seen] : There are no fractures, subluxations or dislocations. No significant abnormalities are seen [FreeTextEntry8] : ttp scapula [de-identified] : active abduction 140 degrees [TWNoteComboBox6] : internal rotation L5 [de-identified] : external rotation 50 degrees [Right] : right knee [NL (0)] : extension 0 degrees [4___] : quadriceps 4[unfilled]/5 [5___] : hamstring 5[unfilled]/5 [Equivocal] : equivocal Iris [] : antalgic [Left] : left knee [Degenerative change] : Degenerative change [de-identified] : nvi distally [TWNoteComboBox7] : flexion 120 degrees

## 2023-07-20 NOTE — ASSESSMENT
[FreeTextEntry1] : new onset of left shoulder, shoulder blade and neck pain since 7/8/23 after hoisting meaghan.   posterior shoulder pain.  likely bursitis, cuff tendonitis.  csi 7/13/23:  \par \par also neck stiffness with left trap pain.  occ left arm radicular symptoms.  likely muscular. \par \par s/p right knee scope for pmm, plm, synovectomy, chondroplasty on 4/18/22.  oa present.   finished orthovisc on 8/5/22. recurrent pain.  csi on 9/2/23. \par \par left knee pain with oa.  recurrent pain.  csi on 9/2/23.

## 2023-07-20 NOTE — HISTORY OF PRESENT ILLNESS
[] : This patient has had an injection before: yes [2] : 2 [Orthovisc] : Orthovisc [10] : 10 [8] : 8 [de-identified] : 7/13/23:  new onset of left shoulder, shoulder blade and neck pain since 7/8/23 after hoisting meaghan.  also recurrent bilateral knee pain.  no new injury. \par 7/20/23:  left shoulder pain still despite csi last visit.   [FreeTextEntry1] :  bilateral knees. left shoulder [de-identified] : 07/13/23

## 2023-07-20 NOTE — DISCUSSION/SUMMARY
[de-identified] : Progress Note completed by Tiffanie Herrera PA-C\par * Dr. Brink -- The documentation recorded in this note accurately reflects the decisions made by me during this visit.

## 2023-08-10 ENCOUNTER — APPOINTMENT (OUTPATIENT)
Dept: ORTHOPEDIC SURGERY | Facility: CLINIC | Age: 73
End: 2023-08-10
Payer: MEDICARE

## 2023-08-10 VITALS — WEIGHT: 213 LBS | BODY MASS INDEX: 30.49 KG/M2 | HEIGHT: 70 IN

## 2023-08-10 PROCEDURE — 20610 DRAIN/INJ JOINT/BURSA W/O US: CPT | Mod: 50

## 2023-08-10 PROCEDURE — 99212 OFFICE O/P EST SF 10 MIN: CPT | Mod: 25

## 2023-08-10 NOTE — DISCUSSION/SUMMARY
[de-identified] : injections as below. follow up in 1 week.   Progress note completed by Tiffanie Herrera PA-C *Dr. Brink - The [facility] assigned on this date is under my supervision and saw this patient independently.  I have reviewed the note/plan and agree with the treatment provided.

## 2023-08-10 NOTE — HISTORY OF PRESENT ILLNESS
[] : This patient has had an injection before: yes [3] : 3 [Orthovisc] : Orthovisc [de-identified] : 7/13/23:  new onset of left shoulder, shoulder blade and neck pain since 7/8/23 after hoisting meaghan.  also recurrent bilateral knee pain.  no new injury.  7/20/23:  left shoulder pain still despite csi last visit.   8/10/23:  bilateral knee pain continues.  [FreeTextEntry1] : bilateral knees  [de-identified] : 07/20/23

## 2023-08-10 NOTE — PROCEDURE
[FreeTextEntry3] : Procedure Name: ORTHOVISC (Large Joint)  Viscosupplementation Injection: X-ray evidence of Osteoarthritis on this or prior visit and Patient has tried OTC's including aspirin, Ibuprofen, Aleve etc or prescription NSAIDS, and/or exercises at home and/ or physical therapy without satisfactory response.  The risks, benefits, and alternatives to Viscosupplementation injection were explained in full to the patient. Risks outlined include but are not limited to infection, sepsis, bleeding, scarring, skin discoloration, temporary increase in pain, syncopal episode, failure to resolve symptoms, allergic reaction, and symptom recurrence. Signs and symptoms of infection reviewed and patient advised to call immediately for redness, fevers, and/or chills. Patient understood the risks. All questions were answered.   Oral informed consent was obtained.   Sterile technique was utilized for the procedure including the preparation of the solutions used for the injection. An injection of Orthovisc 2ml #3 was injected into BILATERAL KNEES.  Patient tolerated the procedure well. Advised to ice the injection site this evening.  Post Procedure Instructions: Patient was advised to call if redness, pain, or fever occur and apply ice for 15 min. out of every hour for the next 12-24 hours as tolerated. patient was advised to rest the joint(s) for 2 days.

## 2023-08-10 NOTE — PHYSICAL EXAM
[Facet arthropathy] : Facet arthropathy [Disc space narrowing] : Disc space narrowing [4 ___] : forward flexion 4[unfilled]/5 [There are no fractures, subluxations or dislocations. No significant abnormalities are seen] : There are no fractures, subluxations or dislocations. No significant abnormalities are seen [FreeTextEntry8] : ttp scapula [de-identified] : active abduction 140 degrees [TWNoteComboBox6] : internal rotation L5 [de-identified] : external rotation 50 degrees [Right] : right knee [NL (0)] : extension 0 degrees [4___] : quadriceps 4[unfilled]/5 [5___] : hamstring 5[unfilled]/5 [Equivocal] : equivocal Iris [] : antalgic [Left] : left knee [Degenerative change] : Degenerative change [de-identified] : nvi distally [TWNoteComboBox7] : flexion 120 degrees

## 2023-08-17 ENCOUNTER — APPOINTMENT (OUTPATIENT)
Dept: ORTHOPEDIC SURGERY | Facility: CLINIC | Age: 73
End: 2023-08-17
Payer: MEDICARE

## 2023-08-17 VITALS — WEIGHT: 213 LBS | BODY MASS INDEX: 30.49 KG/M2 | HEIGHT: 70 IN

## 2023-08-17 PROCEDURE — 99212 OFFICE O/P EST SF 10 MIN: CPT | Mod: 25

## 2023-08-17 PROCEDURE — 20610 DRAIN/INJ JOINT/BURSA W/O US: CPT | Mod: 50

## 2023-08-17 NOTE — HISTORY OF PRESENT ILLNESS
[] : This patient has had an injection before: yes [4] : 4 [Orthovisc] : Orthovisc [de-identified] : 7/13/23:  new onset of left shoulder, shoulder blade and neck pain since 7/8/23 after hoisting meaghan.  also recurrent bilateral knee pain.  no new injury.  7/20/23:  left shoulder pain still despite csi last visit.   8/10/23:  bilateral knee pain continues.  8/17/23:  bilateral knee pain persists.  [FreeTextEntry1] : bilateral knee [de-identified] : 08/10/23

## 2023-08-17 NOTE — ASSESSMENT
[FreeTextEntry1] : new onset of left shoulder, shoulder blade and neck pain since 7/8/23 after hoisting meaghan.   posterior shoulder pain.  likely bursitis, cuff tendonitis.  csi 7/13/23:    also neck stiffness with left trap pain.  occ left arm radicular symptoms.  likely muscular.   s/p right knee scope for pmm, plm, synovectomy, chondroplasty on 4/18/22.  oa present.   finished orthovisc on 8/5/22. recurrent pain.  csi on 9/2/23.   left knee pain with oa.  recurrent pain.  csi on 9/2/23.

## 2023-08-17 NOTE — PROCEDURE
[FreeTextEntry3] : Procedure Name: ORTHOVISC (Large Joint)  Viscosupplementation Injection: X-ray evidence of Osteoarthritis on this or prior visit and Patient has tried OTC's including aspirin, Ibuprofen, Aleve etc or prescription NSAIDS, and/or exercises at home and/ or physical therapy without satisfactory response.  The risks, benefits, and alternatives to Viscosupplementation injection were explained in full to the patient. Risks outlined include but are not limited to infection, sepsis, bleeding, scarring, skin discoloration, temporary increase in pain, syncopal episode, failure to resolve symptoms, allergic reaction, and symptom recurrence. Signs and symptoms of infection reviewed and patient advised to call immediately for redness, fevers, and/or chills. Patient understood the risks. All questions were answered.   Oral informed consent was obtained.   Sterile technique was utilized for the procedure including the preparation of the solutions used for the injection. An injection of Orthovisc 2ml #4 was injected into BILATERAL KNEES.  Patient tolerated the procedure well. Advised to ice the injection site this evening.  Post Procedure Instructions: Patient was advised to call if redness, pain, or fever occur and apply ice for 15 min. out of every hour for the next 12-24 hours as tolerated. patient was advised to rest the joint(s) for 2 days.

## 2023-08-17 NOTE — DISCUSSION/SUMMARY
[de-identified] : injections as below. follow up in 6 weeks.   Progress note completed by Tiffanie Herrera PA-C *Dr. Brink - [facility] assigned on this date is under my supervision and saw this patient independently.  I have reviewed the note/plan and agree with the treatment provided.

## 2023-08-17 NOTE — PHYSICAL EXAM
[Facet arthropathy] : Facet arthropathy [Disc space narrowing] : Disc space narrowing [4 ___] : forward flexion 4[unfilled]/5 [There are no fractures, subluxations or dislocations. No significant abnormalities are seen] : There are no fractures, subluxations or dislocations. No significant abnormalities are seen [FreeTextEntry8] : ttp scapula [de-identified] : active abduction 140 degrees [TWNoteComboBox6] : internal rotation L5 [de-identified] : external rotation 50 degrees [Right] : right knee [NL (0)] : extension 0 degrees [4___] : quadriceps 4[unfilled]/5 [5___] : hamstring 5[unfilled]/5 [Equivocal] : equivocal Iris [] : antalgic [Left] : left knee [Degenerative change] : Degenerative change [de-identified] : nvi distally [TWNoteComboBox7] : flexion 120 degrees

## 2024-03-22 ENCOUNTER — APPOINTMENT (OUTPATIENT)
Dept: ORTHOPEDIC SURGERY | Facility: CLINIC | Age: 74
End: 2024-03-22
Payer: MEDICARE

## 2024-03-22 PROCEDURE — 99214 OFFICE O/P EST MOD 30 MIN: CPT | Mod: 25

## 2024-03-22 PROCEDURE — J3490M: CUSTOM | Mod: NC

## 2024-03-22 PROCEDURE — 20610 DRAIN/INJ JOINT/BURSA W/O US: CPT | Mod: 50

## 2024-03-22 NOTE — ASSESSMENT
[FreeTextEntry1] : left shoulder, shoulder blade and neck pain since 7/8/23 after hoisting meaghan.   neck stiffness with left trap pain.  occ left arm radicular symptoms.    s/p right knee scope for pmm, plm, synovectomy, chondroplasty on 4/18/22.  oa present.  success with orthovisc in past.  left knee pain with oa.  success with orthovisc in past.

## 2024-03-22 NOTE — HISTORY OF PRESENT ILLNESS
[1] : 2 [0] : 0 [de-identified] : 7/13/23:  new onset of left shoulder, shoulder blade and neck pain since 7/8/23 after hoisting meaghan.  also recurrent bilateral knee pain.  no new injury.  7/20/23:  left shoulder pain still despite csi last visit.   8/10/23:  bilateral knee pain continues.  8/17/23:  bilateral knee pain persists.  3/22/24: bilateral knee pain returned about 1 month ago. no injury. advil prn.  [FreeTextEntry1] : bilateral knee [FreeTextEntry5] : right knee pain improving. left knee pain is increasing.  [de-identified] : 08/10/23

## 2024-03-22 NOTE — PROCEDURE
[Large Joint Injection] : Large joint injection [Bilateral] : bilaterally of the [Pain] : pain [Alcohol] : alcohol [Betadine] : betadine [___ cc    3mg] :  Betamethasone (Celestone) ~Vcc of 3mg [___ cc    1%] : Lidocaine ~Vcc of 1%  [] : Patient tolerated procedure well [Call if redness, pain or fever occur] : call if redness, pain or fever occur [Apply ice for 15min out of every hour for the next 12-24 hours as tolerated] : apply ice for 15 minutes out of every hour for the next 12-24 hours as tolerated [Previous OTC use and PT nontherapeutic] : patient has tried OTC's including aspirin, Ibuprofen, Aleve, etc or prescription NSAIDS, and/or exercises at home and/or physical therapy without satisfactory response [Patient was advised to rest the joint(s) for ____ days] : patient was advised to rest the joint(s) for [unfilled] days [Patient had decreased mobility in the joint] : patient had decreased mobility in the joint [Risks, benefits, alternatives discussed / Verbal consent obtained] : the risks benefits, and alternatives have been discussed, and verbal consent was obtained [All ultrasound images have been permanently captured and stored accordingly in our picture archiving and communication system] : All ultrasound images have been permanently captured and stored accordingly in our picture archiving and communication system [Visualization of the needle and placement of injection was performed without complication] : visualization of the needle and placement of injection was performed without complication [Inflammation] : inflammation [de-identified] : for accurate placement of needle into knee joint

## 2024-03-22 NOTE — PHYSICAL EXAM
[FreeTextEntry8] : ttp scapula [de-identified] : active abduction 140 degrees [TWNoteComboBox6] : internal rotation L5 [de-identified] : external rotation 50 degrees [] : mild effusion [de-identified] : nvi distally [TWNoteComboBox7] : flexion 130 degrees

## 2024-04-12 ENCOUNTER — APPOINTMENT (OUTPATIENT)
Dept: ORTHOPEDIC SURGERY | Facility: CLINIC | Age: 74
End: 2024-04-12
Payer: MEDICARE

## 2024-04-12 PROCEDURE — 20610 DRAIN/INJ JOINT/BURSA W/O US: CPT | Mod: 50

## 2024-04-12 PROCEDURE — 99214 OFFICE O/P EST MOD 30 MIN: CPT | Mod: 25

## 2024-04-12 NOTE — PHYSICAL EXAM
[Facet arthropathy] : Facet arthropathy [Disc space narrowing] : Disc space narrowing [4 ___] : forward flexion 4[unfilled]/5 [There are no fractures, subluxations or dislocations. No significant abnormalities are seen] : There are no fractures, subluxations or dislocations. No significant abnormalities are seen [Right] : right knee [Left] : left knee [NL (0)] : extension 0 degrees [4___] : quadriceps 4[unfilled]/5 [5___] : hamstring 5[unfilled]/5 [Equivocal] : equivocal Iris [FreeTextEntry8] : ttp scapula [de-identified] : active abduction 140 degrees [TWNoteComboBox6] : internal rotation L5 [de-identified] : external rotation 50 degrees [] : no erythema [de-identified] : nvi distally [TWNoteComboBox7] : flexion 130 degrees

## 2024-04-12 NOTE — PROCEDURE
[FreeTextEntry3] : Procedure Name: ORTHOVISC (Large Joint)  Viscosupplementation Injection: X-ray evidence of Osteoarthritis on this or prior visit and Patient has tried OTC's including aspirin, Ibuprofen, Aleve etc or prescription NSAIDS, and/or exercises at home and/ or physical therapy without satisfactory response.  The risks, benefits, and alternatives to Viscosupplementation injection were explained in full to the patient. Risks outlined include but are not limited to infection, sepsis, bleeding, scarring, skin discoloration, temporary increase in pain, syncopal episode, failure to resolve symptoms, allergic reaction, and symptom recurrence. Signs and symptoms of infection reviewed and patient advised to call immediately for redness, fevers, and/or chills. Patient understood the risks. All questions were answered.   Oral informed consent was obtained.   Sterile technique was utilized for the procedure including the preparation of the solutions used for the injection. An injection of Orthovisc 2ml #1 was injected into BILATERAL KNEES.  Patient tolerated the procedure well. Advised to ice the injection site this evening.  Post Procedure Instructions: Patient was advised to call if redness, pain, or fever occur and apply ice for 15 min. out of every hour for the next 12-24 hours as tolerated. patient was advised to rest the joint(s) for 2 days.

## 2024-04-12 NOTE — DISCUSSION/SUMMARY
[de-identified] : injections as below. follow up in 1 week.   Progress note completed by Tiffanie Herrera PA-C *Dr. Brink - [facility] assigned on this date is under my supervision and saw this patient independently.  I have reviewed the note and agree with the treatment provided.

## 2024-04-12 NOTE — HISTORY OF PRESENT ILLNESS
[Gradual] : gradual [0] : 0 [Stabbing] : stabbing [Intermittent] : intermittent [Rest] : rest [Exercising] : exercising [1] : 1 [Orthovisc] : Orthovisc [de-identified] : 7/13/23:  new onset of left shoulder, shoulder blade and neck pain since 7/8/23 after hoisting meaghan.  also recurrent bilateral knee pain.  no new injury.  7/20/23:  left shoulder pain still despite csi last visit.   8/10/23:  bilateral knee pain continues.  8/17/23:  bilateral knee pain persists.  3/22/24: bilateral knee pain returned about 1 month ago. no injury. advil prn.  4/12/24:  bilateral knee pain continues.  [] : no [FreeTextEntry1] : bilateral knee [FreeTextEntry5] : right knee pain improving. left knee pain is increasing.

## 2024-04-19 ENCOUNTER — APPOINTMENT (OUTPATIENT)
Dept: ORTHOPEDIC SURGERY | Facility: CLINIC | Age: 74
End: 2024-04-19
Payer: MEDICARE

## 2024-04-19 VITALS — HEIGHT: 70 IN | BODY MASS INDEX: 30.49 KG/M2 | WEIGHT: 213 LBS

## 2024-04-19 PROCEDURE — 20611 DRAIN/INJ JOINT/BURSA W/US: CPT | Mod: 50

## 2024-04-26 ENCOUNTER — APPOINTMENT (OUTPATIENT)
Dept: ORTHOPEDIC SURGERY | Facility: CLINIC | Age: 74
End: 2024-04-26
Payer: MEDICARE

## 2024-04-26 PROCEDURE — 20611 DRAIN/INJ JOINT/BURSA W/US: CPT | Mod: 50

## 2024-04-26 PROCEDURE — 99212 OFFICE O/P EST SF 10 MIN: CPT | Mod: 25

## 2024-04-26 NOTE — HISTORY OF PRESENT ILLNESS
[Gradual] : gradual [1] : 2 [0] : 0 [Stabbing] : stabbing [Intermittent] : intermittent [Rest] : rest [Exercising] : exercising [3] : 3 [Orthovisc] : Orthovisc [de-identified] : 7/13/23:  new onset of left shoulder, shoulder blade and neck pain since 7/8/23 after hoisting meaghan.  also recurrent bilateral knee pain.  no new injury.  7/20/23:  left shoulder pain still despite csi last visit.   8/10/23:  bilateral knee pain continues.  8/17/23:  bilateral knee pain persists.  3/22/24: bilateral knee pain returned about 1 month ago. no injury. advil prn.  4/12/24:  bilateral knee pain continues.  4/19/24: bilateral knee pain persists. Pt presents for orthovisc injection #2. Pt tolerated last injections well. 4/26/24: teena knee pain. pt presents for ov #3. no adverse rxn to previous inj [] : no [FreeTextEntry1] : bilateral knee [FreeTextEntry5] : right knee pain improving. left knee pain is increasing.

## 2024-04-26 NOTE — DISCUSSION/SUMMARY
[de-identified] : injection as below. hep f/u 1 week to complete series  Progress note completed by Kimberly Mosley PA-C. *Dr. Brink - The [facility] assigned on this date is under my supervision and saw this patient independently. I have reviewed the note and agree with the treatment provided.

## 2024-04-26 NOTE — ASSESSMENT
[FreeTextEntry1] : left shoulder, shoulder blade and neck pain since 7/8/23 after hoisting meaghan.   neck stiffness with left trap pain.  occ left arm radicular symptoms.    s/p right knee scope for pmm, plm, synovectomy, chondroplasty on 4/18/22.  oa present.  success with orthovisc in past.  csi on 3/22/24.   left knee pain with oa.  success with orthovisc in past.   csi on 3/22/24.

## 2024-04-26 NOTE — PHYSICAL EXAM
[Facet arthropathy] : Facet arthropathy [Disc space narrowing] : Disc space narrowing [4 ___] : forward flexion 4[unfilled]/5 [There are no fractures, subluxations or dislocations. No significant abnormalities are seen] : There are no fractures, subluxations or dislocations. No significant abnormalities are seen [Right] : right knee [Left] : left knee [NL (0)] : extension 0 degrees [4___] : quadriceps 4[unfilled]/5 [5___] : hamstring 5[unfilled]/5 [Equivocal] : equivocal Iris [FreeTextEntry8] : ttp scapula [de-identified] : active abduction 140 degrees [TWNoteComboBox6] : internal rotation L5 [de-identified] : external rotation 50 degrees [] : no erythema [de-identified] : nvi distally [TWNoteComboBox7] : flexion 130 degrees

## 2024-05-03 ENCOUNTER — APPOINTMENT (OUTPATIENT)
Dept: ORTHOPEDIC SURGERY | Facility: CLINIC | Age: 74
End: 2024-05-03
Payer: MEDICARE

## 2024-05-03 DIAGNOSIS — M17.12 UNILATERAL PRIMARY OSTEOARTHRITIS, LEFT KNEE: ICD-10-CM

## 2024-05-03 DIAGNOSIS — M17.11 UNILATERAL PRIMARY OSTEOARTHRITIS, RIGHT KNEE: ICD-10-CM

## 2024-05-03 PROCEDURE — 20610 DRAIN/INJ JOINT/BURSA W/O US: CPT | Mod: 50

## 2024-05-03 PROCEDURE — 99212 OFFICE O/P EST SF 10 MIN: CPT | Mod: 25

## 2024-05-03 NOTE — PHYSICAL EXAM
[Facet arthropathy] : Facet arthropathy [Disc space narrowing] : Disc space narrowing [4 ___] : forward flexion 4[unfilled]/5 [There are no fractures, subluxations or dislocations. No significant abnormalities are seen] : There are no fractures, subluxations or dislocations. No significant abnormalities are seen [FreeTextEntry8] : ttp scapula [de-identified] : active abduction 140 degrees [TWNoteComboBox6] : internal rotation L5 [de-identified] : external rotation 50 degrees [Right] : right knee [Left] : left knee [NL (0)] : extension 0 degrees [4___] : quadriceps 4[unfilled]/5 [5___] : hamstring 5[unfilled]/5 [Equivocal] : equivocal Iris [] : antalgic [de-identified] : nvi distally [TWNoteComboBox7] : flexion 130 degrees

## 2024-05-03 NOTE — DISCUSSION/SUMMARY
[de-identified] : injection as below. follow up in 6 weeks.   Progress note completed by Tiffanie Herrera PA-C *Dr. Brink - The [facility] assigned on this date is under my supervision and saw this patient independently.  I have reviewed the note and agree with the treatment provided.

## 2024-05-03 NOTE — HISTORY OF PRESENT ILLNESS
[de-identified] : 7/13/23:  new onset of left shoulder, shoulder blade and neck pain since 7/8/23 after hoisting meaghan.  also recurrent bilateral knee pain.  no new injury.  7/20/23:  left shoulder pain still despite csi last visit.   8/10/23:  bilateral knee pain continues.  8/17/23:  bilateral knee pain persists.  3/22/24: bilateral knee pain returned about 1 month ago. no injury. advil prn.  4/12/24:  bilateral knee pain continues.  4/19/24: bilateral knee pain persists. Pt presents for orthovisc injection #2. Pt tolerated last injections well. 4/26/24: teena knee pain. pt presents for ov #3. no adverse rxn to previous inj 5/3/24:  bilateral knee pain.  continued pain.

## 2024-09-06 ENCOUNTER — APPOINTMENT (OUTPATIENT)
Dept: ORTHOPEDIC SURGERY | Facility: CLINIC | Age: 74
End: 2024-09-06
Payer: MEDICARE

## 2024-09-06 DIAGNOSIS — M17.11 UNILATERAL PRIMARY OSTEOARTHRITIS, RIGHT KNEE: ICD-10-CM

## 2024-09-06 DIAGNOSIS — M17.12 UNILATERAL PRIMARY OSTEOARTHRITIS, LEFT KNEE: ICD-10-CM

## 2024-09-06 PROCEDURE — 99214 OFFICE O/P EST MOD 30 MIN: CPT | Mod: 25

## 2024-09-06 PROCEDURE — 20611 DRAIN/INJ JOINT/BURSA W/US: CPT | Mod: LT

## 2024-09-06 PROCEDURE — J3490M: CUSTOM | Mod: NC,JZ

## 2024-09-06 RX ORDER — IBUPROFEN 600 MG/1
600 TABLET, FILM COATED ORAL TWICE DAILY
Qty: 60 | Refills: 2 | Status: ACTIVE | COMMUNITY
Start: 2024-09-06 | End: 1900-01-01

## 2024-09-06 NOTE — HISTORY OF PRESENT ILLNESS
[Gradual] : gradual [1] : 2 [0] : 0 [Stabbing] : stabbing [Intermittent] : intermittent [Rest] : rest [Exercising] : exercising [3] : 3 [Orthovisc] : Orthovisc [de-identified] : 7/13/23:  new onset of left shoulder, shoulder blade and neck pain since 7/8/23 after hoisting meaghan.  also recurrent bilateral knee pain.  no new injury.  7/20/23:  left shoulder pain still despite csi last visit.   8/10/23:  bilateral knee pain continues.  8/17/23:  bilateral knee pain persists.  3/22/24: bilateral knee pain returned about 1 month ago. no injury. advil prn.  4/12/24:  bilateral knee pain continues.  4/19/24: bilateral knee pain persists. Pt presents for orthovisc injection #2. Pt tolerated last injections well. 4/26/24: teena knee pain. pt presents for ov #3. no adverse rxn to previous inj 9/6/24:  continued pain bilateral knees.  left worse than right. [] : no [FreeTextEntry1] : bilateral knee [FreeTextEntry5] : Pt has been havoing an increase in left knee pain since finishing his inj in b/l knees

## 2024-09-06 NOTE — PROCEDURE
[FreeTextEntry3] : Procedure Name: Large Joint Injection / Aspiration: Celestone, Lidocaine and Marcaine with Ultrasound Evaluation and Guidance    Large Joint Injection was performed because of pain and inflammation. Anesthesia: ethyl chloride sprayed topically.   Celestone: An injection of Celestone 6 mg , 1 cc.   Lidocaine 1%: 3 cc.   Marcaine 0.25%:  3 cc.    Patient has tried OTC's including aspirin, Ibuprofen, Aleve etc or prescription NSAIDS, and/or exercises at home and/ or physical therapy without satisfactory response and Patient has decreased mobility in the joint. The risks, benefits, and alternatives to cortisone injection were explained in full to the patient. Risks outlined include but are not limited to infection, sepsis, bleeding, scarring, skin discoloration, temporary increase in pain, syncopal episode, failure to resolve symptoms, allergic reaction, symptom recurrence, and elevation of blood sugar in diabetics. Patient understood the risks. All questions were answered.   Oral informed consent was obtained.   Sterile technique was utilized for the procedure including the preparation of the solutions used for the injection. Medication was injected into LEFT KNEE.   Patient tolerated the procedure well. Advised to ice the injection site this evening.  Post Procedure Instructions: Patient was advised to call if redness, pain, or fever occur and apply ice for 15 min. out of every hour for the next 12-24 hours as tolerated. patient was advised to rest the joint(s) for 2 days.   Ultrasound Extremity (11811) was used because of the following reasons: inflammation.   Ultrasound Guidance was used for the following reasons: for accurate placement of needle into knee joint.   Diagnostic ultrasound was performed of the knee and is positive for synovitis.   Ultrasound guided injection was performed of the knee, visualization of the needle and placement of injection was performed without complication.

## 2024-09-06 NOTE — ASSESSMENT
[FreeTextEntry1] : s/p right knee scope for pmm, plm, synovectomy, chondroplasty on 4/18/22.  oa present.  success with orthovisc in past.  csi on 3/22/24.  orthovisc finished on 5/3/24. right improved.  left knee pain with oa.  success with orthovisc in past.   csi on 3/22/24.   orthovisc finished on 5/3/24.  left getting worse.  considering tka.  left shoulder, shoulder blade and neck pain since 7/8/23 after hoisting meaghan.   neck stiffness with left trap pain.  occ left arm radicular symptoms.

## 2024-09-06 NOTE — PHYSICAL EXAM
[Facet arthropathy] : Facet arthropathy [Disc space narrowing] : Disc space narrowing [4 ___] : forward flexion 4[unfilled]/5 [There are no fractures, subluxations or dislocations. No significant abnormalities are seen] : There are no fractures, subluxations or dislocations. No significant abnormalities are seen [Right] : right knee [Left] : left knee [NL (0)] : extension 0 degrees [4___] : quadriceps 4[unfilled]/5 [5___] : hamstring 5[unfilled]/5 [Equivocal] : equivocal Iris [FreeTextEntry8] : ttp scapula [de-identified] : active abduction 140 degrees [TWNoteComboBox6] : internal rotation L5 [de-identified] : external rotation 50 degrees [] : no erythema [de-identified] : nvi distally [TWNoteComboBox7] : flexion 130 degrees

## 2024-09-06 NOTE — DISCUSSION/SUMMARY
[de-identified] : injection as above. joint consult. ibuprofen. The patient's orthopaedic condition(s) warrants intermittent use of a prescription strength non-steroidal anti-inflammatory medication.  These medications are associated with risks including but not limited to gastrointestinal irritation, kidney damage, hypertension, and bleeding.  The patient understands and will take medications as prescribed.  The patient will stop the medication and consult a physician as needed if problems arise.

## 2024-10-01 ENCOUNTER — APPOINTMENT (OUTPATIENT)
Dept: ORTHOPEDIC SURGERY | Facility: CLINIC | Age: 74
End: 2024-10-01
Payer: MEDICARE

## 2024-10-01 VITALS — HEIGHT: 70 IN | BODY MASS INDEX: 30.49 KG/M2 | WEIGHT: 213 LBS

## 2024-10-01 DIAGNOSIS — I71.20 THORACIC AORTIC ANEURYSM, WITHOUT RUPTURE, UNSPECIFIED: ICD-10-CM

## 2024-10-01 DIAGNOSIS — M17.12 UNILATERAL PRIMARY OSTEOARTHRITIS, LEFT KNEE: ICD-10-CM

## 2024-10-01 PROCEDURE — 99214 OFFICE O/P EST MOD 30 MIN: CPT

## 2024-10-01 PROCEDURE — 73562 X-RAY EXAM OF KNEE 3: CPT | Mod: LT

## 2024-10-01 NOTE — PHYSICAL EXAM
[de-identified] : False [TWNoteComboBox6] : False [de-identified] : False [TWNoteComboBox7] : False

## 2024-10-01 NOTE — DISCUSSION/SUMMARY
[de-identified] : The patient was advised of the diagnosis.  The natural history of the pathology was explained in full to the patient in layman's terms. All questions were answered.  The risks and benefits of surgical and non-surgical treatment alternatives were explained in full to the patient.  The natural progression of Osteoarthritis was explained to the patient. We discussed the possible treatment options from conservative to operative. These included NSAIDS, Glucosamine and Chondroitin sulfate, and Physical Therapy as well different types of injections. We also discussed that at some point they may progress to needing a TKA.  Information and pamphlets were given when appropriate.   Patient Complains of pain in Knee with a level that often reaches greater than an 8/10. The Pain has been progressively worsening of his/her treatment course. The pain has interfered with their ADLs and worsens with weight bearing. On exam they often have episodes of swelling/effusion with limited ROM. Pain worsens with ROM passive and active and I can palpate crepitus.   X-rays were reviewed with the patient, and they show joint space narrowing, subchondral sclerosis, osteophyte formation, and subchondral cysts.   After a period of more than 12 weeks physical therapy or exercise program done with me or another treating physician, they have continued pain. The patient has failed a trial of NSAID medication or pain relievers if they were unable to tolerate NSAID medications as well as a series of injection, steroid or Hyaluronic Acid. After a long discussion with the patient both the patient and I have decided we have exhausted all forms of less radical treatments, and they would like to proceed with Total Knee Replacement   We discussed my findings and the natural history of their condition. We talked about the details of the proposed surgery and the recovery. We discussed the material risks, possible benefits and alternatives to surgery. The risks include but are not limited to infection, bleeding and possible need for blood transfusion, fracture, bowel blockage, bladder retention or infection, need for reoperation, stiffness and/or limited range of motion, possible damage to nerves and blood vessels, failure of fixation of components, risk of deep vein thromboses and pulmonary embolism, wound healing problems, dislocation, and possible leg length discrepancy. Although incredibly rare, we also discussed the risks of a cardiac event, stroke and even death during, or following, the surgery. We discussed the type of implants the patient will be receiving and the type of fixation that will be used, as well as whether a robot or computer navigation aide will be used. The patient understands they will need medical clearance and will attend a preoperative joint education class. We also discussed the type of anesthesia they will receive, and the risks associated with hospital or rehab length of stay, obesity, diabetes and smoking.  Progress Note completed by LUCY Francis * Dr. Clemente -- The documentation recorded in this note accurately reflects the decisions made by me during this visit.  I interviewed and examined the patient personally and oversaw all medical decisions.

## 2024-10-01 NOTE — HISTORY OF PRESENT ILLNESS
[de-identified] : 10/1/24: 73yo M with longstanding left knee pain that has been gradually worsening over the past few months with no injury.  Admits to increasing pain and difficulty with prolonged walking/standing, startup, and stairs. He has been treated by Dr. Brink with CSIs and gel injections over the past couple of years. HA 04/26/24 and csi inj 09/06/24 with no relief. Also hx of L knee scope with Dr. Brink 5+ years ago.  [] : no [FreeTextEntry5] : teena knee pain for couple years L>R. Having medial pain. Sees [place] for HA (04/26/24) and csi inj (09/06/24) with no relief. Stated oa on teena knees. Was given ibuprofen with slight relief.  h/o of meniscus repair with [place] 04/18/22 [de-identified] : 2022

## 2024-10-01 NOTE — IMAGING
[Left] : left knee [advanced tricompartmental OA with medial compartment narrowing and varus alignment] : advanced tricompartmental OA with medial compartment narrowing and varus alignment [de-identified] : LEFT KNEE No Effusion Varus deformity  +Medial joint line tenderness +PF tenderness ROM 0-120 5/5 Strength NVI  Non-antalgic gait w/o assistance  [FreeTextEntry9] : KL 4

## 2024-10-01 NOTE — ASSESSMENT
[FreeTextEntry1] : 10/1/24: Adv L knee OA. Discussed anti-inflammatories, PT/HEP, CSI, visco injections, and TKA. He has tried conservative tx with Dr. Brink over the past few years with no relief. Discussed TKA, r/b/a, and preop/postop periods in detail. Addressed his questions and concerns. He is well informed and would like to proceed with L TKA JORGE. Will obtain CT Left Knee to eval for bone loss and for presurgical eval.

## 2024-10-14 ENCOUNTER — APPOINTMENT (OUTPATIENT)
Dept: CT IMAGING | Facility: CLINIC | Age: 74
End: 2024-10-14

## 2024-10-14 PROCEDURE — 73721 MRI JNT OF LWR EXTRE W/O DYE: CPT | Mod: LT,MH

## 2024-10-18 ENCOUNTER — NON-APPOINTMENT (OUTPATIENT)
Age: 74
End: 2024-10-18

## 2024-10-19 ENCOUNTER — NON-APPOINTMENT (OUTPATIENT)
Age: 74
End: 2024-10-19

## 2024-11-19 NOTE — ASSESSMENT
Detail Level: Zone [FreeTextEntry1] : s/p right knee scope for pmm, plm, synovectomy, chondroplasty on 4/18/22.  oa present.  some increased pain last few days. no fevers and chills.  likely oa exacerbation Detail Level: Generalized

## 2025-02-07 ENCOUNTER — APPOINTMENT (OUTPATIENT)
Dept: ORTHOPEDIC SURGERY | Facility: CLINIC | Age: 75
End: 2025-02-07
Payer: MEDICARE

## 2025-02-07 DIAGNOSIS — M17.12 UNILATERAL PRIMARY OSTEOARTHRITIS, LEFT KNEE: ICD-10-CM

## 2025-02-07 PROCEDURE — 20610 DRAIN/INJ JOINT/BURSA W/O US: CPT | Mod: 50

## 2025-02-07 PROCEDURE — 99213 OFFICE O/P EST LOW 20 MIN: CPT | Mod: 25

## 2025-02-14 ENCOUNTER — APPOINTMENT (OUTPATIENT)
Dept: ORTHOPEDIC SURGERY | Facility: CLINIC | Age: 75
End: 2025-02-14
Payer: MEDICARE

## 2025-02-14 DIAGNOSIS — M17.11 UNILATERAL PRIMARY OSTEOARTHRITIS, RIGHT KNEE: ICD-10-CM

## 2025-02-14 PROCEDURE — 20611 DRAIN/INJ JOINT/BURSA W/US: CPT | Mod: RT

## 2025-03-07 ENCOUNTER — APPOINTMENT (OUTPATIENT)
Dept: ORTHOPEDIC SURGERY | Facility: CLINIC | Age: 75
End: 2025-03-07
Payer: MEDICARE

## 2025-03-07 PROCEDURE — 20611 DRAIN/INJ JOINT/BURSA W/US: CPT | Mod: RT

## 2025-03-07 PROCEDURE — 76881 US COMPL JOINT R-T W/IMG: CPT | Mod: 59,RT

## 2025-03-14 ENCOUNTER — APPOINTMENT (OUTPATIENT)
Dept: ORTHOPEDIC SURGERY | Facility: CLINIC | Age: 75
End: 2025-03-14
Payer: MEDICARE

## 2025-03-14 DIAGNOSIS — M17.11 UNILATERAL PRIMARY OSTEOARTHRITIS, RIGHT KNEE: ICD-10-CM

## 2025-03-14 PROCEDURE — 20611 DRAIN/INJ JOINT/BURSA W/US: CPT | Mod: RT

## 2025-03-14 PROCEDURE — 99213 OFFICE O/P EST LOW 20 MIN: CPT | Mod: 25

## 2025-04-16 ENCOUNTER — NON-APPOINTMENT (OUTPATIENT)
Age: 75
End: 2025-04-16

## 2025-04-18 ENCOUNTER — OUTPATIENT (OUTPATIENT)
Dept: OUTPATIENT SERVICES | Facility: HOSPITAL | Age: 75
LOS: 1 days | End: 2025-04-18

## 2025-04-18 VITALS
TEMPERATURE: 98 F | SYSTOLIC BLOOD PRESSURE: 103 MMHG | HEIGHT: 70.5 IN | DIASTOLIC BLOOD PRESSURE: 70 MMHG | HEART RATE: 64 BPM | WEIGHT: 217.16 LBS | RESPIRATION RATE: 17 BRPM | OXYGEN SATURATION: 97 %

## 2025-04-18 DIAGNOSIS — Z98.89 OTHER SPECIFIED POSTPROCEDURAL STATES: Chronic | ICD-10-CM

## 2025-04-18 DIAGNOSIS — M17.12 UNILATERAL PRIMARY OSTEOARTHRITIS, LEFT KNEE: ICD-10-CM

## 2025-04-18 DIAGNOSIS — Z95.2 PRESENCE OF PROSTHETIC HEART VALVE: Chronic | ICD-10-CM

## 2025-04-18 DIAGNOSIS — Z98.1 ARTHRODESIS STATUS: Chronic | ICD-10-CM

## 2025-04-18 DIAGNOSIS — Z01.812 ENCOUNTER FOR PREPROCEDURAL LABORATORY EXAMINATION: ICD-10-CM

## 2025-04-18 DIAGNOSIS — M20.22 HALLUX RIGIDUS, LEFT FOOT: Chronic | ICD-10-CM

## 2025-04-18 DIAGNOSIS — Z01.818 ENCOUNTER FOR OTHER PREPROCEDURAL EXAMINATION: ICD-10-CM

## 2025-04-18 DIAGNOSIS — Z95.1 PRESENCE OF AORTOCORONARY BYPASS GRAFT: Chronic | ICD-10-CM

## 2025-04-18 LAB
A1C WITH ESTIMATED AVERAGE GLUCOSE RESULT: 5.6 % — SIGNIFICANT CHANGE UP (ref 4–5.6)
ALBUMIN SERPL ELPH-MCNC: 4 G/DL — SIGNIFICANT CHANGE UP (ref 3.3–5)
ALP SERPL-CCNC: 57 U/L — SIGNIFICANT CHANGE UP (ref 40–120)
ALT FLD-CCNC: 29 U/L — SIGNIFICANT CHANGE UP (ref 12–78)
ANION GAP SERPL CALC-SCNC: 3 MMOL/L — LOW (ref 5–17)
APTT BLD: 28.5 SEC — SIGNIFICANT CHANGE UP (ref 24.5–35.6)
AST SERPL-CCNC: 29 U/L — SIGNIFICANT CHANGE UP (ref 15–37)
BASOPHILS # BLD AUTO: 0.06 K/UL — SIGNIFICANT CHANGE UP (ref 0–0.2)
BASOPHILS NFR BLD AUTO: 1.1 % — SIGNIFICANT CHANGE UP (ref 0–2)
BILIRUB SERPL-MCNC: 0.5 MG/DL — SIGNIFICANT CHANGE UP (ref 0.2–1.2)
BLD GP AB SCN SERPL QL: SIGNIFICANT CHANGE UP
BUN SERPL-MCNC: 31 MG/DL — HIGH (ref 7–23)
CALCIUM SERPL-MCNC: 9.2 MG/DL — SIGNIFICANT CHANGE UP (ref 8.5–10.1)
CHLORIDE SERPL-SCNC: 112 MMOL/L — HIGH (ref 96–108)
CO2 SERPL-SCNC: 25 MMOL/L — SIGNIFICANT CHANGE UP (ref 22–31)
CREAT SERPL-MCNC: 1.53 MG/DL — HIGH (ref 0.5–1.3)
EGFR: 48 ML/MIN/1.73M2 — LOW
EGFR: 48 ML/MIN/1.73M2 — LOW
EOSINOPHIL # BLD AUTO: 0.21 K/UL — SIGNIFICANT CHANGE UP (ref 0–0.5)
EOSINOPHIL NFR BLD AUTO: 3.9 % — SIGNIFICANT CHANGE UP (ref 0–6)
ESTIMATED AVERAGE GLUCOSE: 114 MG/DL — SIGNIFICANT CHANGE UP (ref 68–114)
GLUCOSE SERPL-MCNC: 90 MG/DL — SIGNIFICANT CHANGE UP (ref 70–99)
HCT VFR BLD CALC: 36.3 % — LOW (ref 39–50)
HGB BLD-MCNC: 12.4 G/DL — LOW (ref 13–17)
IMM GRANULOCYTES NFR BLD AUTO: 0.4 % — SIGNIFICANT CHANGE UP (ref 0–0.9)
INR BLD: 0.96 RATIO — SIGNIFICANT CHANGE UP (ref 0.85–1.16)
LYMPHOCYTES # BLD AUTO: 1.07 K/UL — SIGNIFICANT CHANGE UP (ref 1–3.3)
LYMPHOCYTES # BLD AUTO: 19.6 % — SIGNIFICANT CHANGE UP (ref 13–44)
MCHC RBC-ENTMCNC: 28.2 PG — SIGNIFICANT CHANGE UP (ref 27–34)
MCHC RBC-ENTMCNC: 34.2 G/DL — SIGNIFICANT CHANGE UP (ref 32–36)
MCV RBC AUTO: 82.5 FL — SIGNIFICANT CHANGE UP (ref 80–100)
MONOCYTES # BLD AUTO: 0.64 K/UL — SIGNIFICANT CHANGE UP (ref 0–0.9)
MONOCYTES NFR BLD AUTO: 11.7 % — SIGNIFICANT CHANGE UP (ref 2–14)
MRSA PCR RESULT.: SIGNIFICANT CHANGE UP
NEUTROPHILS # BLD AUTO: 3.45 K/UL — SIGNIFICANT CHANGE UP (ref 1.8–7.4)
NEUTROPHILS NFR BLD AUTO: 63.3 % — SIGNIFICANT CHANGE UP (ref 43–77)
NRBC BLD AUTO-RTO: 0 /100 WBCS — SIGNIFICANT CHANGE UP (ref 0–0)
PLATELET # BLD AUTO: 152 K/UL — SIGNIFICANT CHANGE UP (ref 150–400)
POTASSIUM SERPL-MCNC: 3.9 MMOL/L — SIGNIFICANT CHANGE UP (ref 3.5–5.3)
POTASSIUM SERPL-SCNC: 3.9 MMOL/L — SIGNIFICANT CHANGE UP (ref 3.5–5.3)
PROT SERPL-MCNC: 6.8 GM/DL — SIGNIFICANT CHANGE UP (ref 6–8.3)
PROTHROM AB SERPL-ACNC: 10.9 SEC — SIGNIFICANT CHANGE UP (ref 9.9–13.4)
RBC # BLD: 4.4 M/UL — SIGNIFICANT CHANGE UP (ref 4.2–5.8)
RBC # FLD: 13 % — SIGNIFICANT CHANGE UP (ref 10.3–14.5)
S AUREUS DNA NOSE QL NAA+PROBE: SIGNIFICANT CHANGE UP
SODIUM SERPL-SCNC: 140 MMOL/L — SIGNIFICANT CHANGE UP (ref 135–145)
VIT D25+D1,25 OH+D1,25 PNL SERPL-MCNC: 31 PG/ML — SIGNIFICANT CHANGE UP (ref 19.9–79.3)
WBC # BLD: 5.45 K/UL — SIGNIFICANT CHANGE UP (ref 3.8–10.5)
WBC # FLD AUTO: 5.45 K/UL — SIGNIFICANT CHANGE UP (ref 3.8–10.5)

## 2025-04-18 NOTE — H&P PST ADULT - ASSESSMENT
osteoarthritis left knee.  CAPRINI SCORE    AGE RELATED RISK FACTORS                                                             [ ] Age 41-60 years                                            (1 Point)  [x ] Age: 61-74 years                                           (2 Points)                 [ ] Age= 75 years                                                (3 Points)             DISEASE RELATED RISK FACTORS                                                       [ ] Edema in the lower extremities                 (1 Point)                     [ ] Varicose veins                                               (1 Point)                                 [ x] BMI > 25 Kg/m2                                            (1 Point)                                  [ ] Serious infection (ie PNA)                            (1 Point)                     [ ] Lung disease ( COPD, Emphysema)            (1 Point)                                                                          [ ] Acute myocardial infarction                         (1 Point)                  [ ] Congestive heart failure (in the previous month)  (1 Point)         [ ] Inflammatory bowel disease                            (1 Point)                  [ ] Central venous access, PICC or Port               (2 points)       (within the last month)                                                                [ ] Stroke (in the previous month)                        (5 Points)    [ ] Previous or present malignancy                       (2 points)                                                                                                                                                         HEMATOLOGY RELATED FACTORS                                                         [ ] Prior episodes of VTE                                     (3 Points)                     [ ] Positive family history for VTE                      (3 Points)                  [ ] Prothrombin 22250 A                                     (3 Points)                     [ ] Factor V Leiden                                                (3 Points)                        [ ] Lupus anticoagulants                                      (3 Points)                                                           [ ] Anticardiolipin antibodies                              (3 Points)                                                       [ ] High homocysteine in the blood                   (3 Points)                                             [ ] Other congenital or acquired thrombophilia      (3 Points)                                                [ ] Heparin induced thrombocytopenia                  (3 Points)                                        MOBILITY RELATED FACTORS  [ ] Bed rest                                                         (1 Point)  [ ] Plaster cast                                                    (2 points)  [ ] Bed bound for more than 72 hours           (2 Points)    GENDER SPECIFIC FACTORS  [ ] Pregnancy or had a baby within the last month   (1 Point)  [ ] Post-partum < 6 weeks                                   (1 Point)  [ ] Hormonal therapy  or oral contraception   (1 Point)  [ ] History of pregnancy complications              (1 point)  [ ] Unexplained or recurrent              (1 Point)    OTHER RISK FACTORS                                           (1 Point)  [ ] BMI >40, smoking, diabetes requiring insulin, chemotherapy  blood transfusions and length of surgery over 2 hours    SURGERY RELATED RISK FACTORS  [ ]  Section within the last month     (1 Point)  [ ] Minor surgery                                                  (1 Point)  [ ] Arthroscopic surgery                                       (2 Points)  [ ] Planned major surgery lasting more            (2 Points)      than 45 minutes     [ x] Elective hip or knee joint replacement       (5 points)       surgery                                                TRAUMA RELATED RISK FACTORS  [ ] Fracture of the hip, pelvis, or leg                       (5 Points)  [ ] Spinal cord injury resulting in paralysis             (5 points)       (in the previous month)    [ ] Paralysis  (less than 1 month)                             (5 Points)  [ ] Multiple Trauma within 1 month                        (5 Points)    Total Score [   9    ]    Caprini Score 0-2: Low Risk, NO VTE prophylaxis required for most patients, encourage ambulation  Caprini Score 3-6: Moderate Risk , pharmacologic VTE prophylaxis is indicated for most patients (in the absence of contraindications)  Caprini Score Greater than or =7: High risk, pharmocologic VTE prophylaxis indicated for most patients (in the absence of contraindications)

## 2025-04-18 NOTE — H&P PST ADULT - ENDOCRINE COMMENTS
T2DM denies diabetes, however was on Metformin, recently stopped due to high creatinine as per cardiologist

## 2025-04-18 NOTE — H&P PST ADULT - NSICDXFAMILYHX_GEN_ALL_CORE_FT
FAMILY HISTORY:  Father  Still living? No  FH: kidney cancer, Age at diagnosis: Age Unknown    Mother  Still living? Unknown  FH: heart disease, Age at diagnosis: Age Unknown

## 2025-04-18 NOTE — OCCUPATIONAL THERAPY INITIAL EVALUATION ADULT - ADDITIONAL COMMENTS
Pt lives with spouse (Who can assist post op) in a private house with 3 steps to enter with bilateral handrail (close to reach). Once inside, the pt bedroom and bathroom is on that floor when entering. The pts bathroom has a walk in shower stall, fixed/retractable shower head, comfort height toilet seat and grab bars +. The pt ambulates with no device and does not own any device for ambulation. The pt daily pain is a 4/10 at rest and a 7-8/10 with movement. The pt manages the pain with rest and Advil. The pt has no recent outpatient PT, no recent falls and has buckling of the knees. The pt wears glasses all the time, R handed, drives and has no hearing impairments.

## 2025-04-18 NOTE — H&P PST ADULT - MUSCULOSKELETAL
details… no joint warmth/no calf tenderness/decreased ROM due to pain/strength 5/5 bilateral upper extremities/strength 5/5 bilateral lower extremities/no chest wall tenderness

## 2025-04-18 NOTE — H&P PST ADULT - NSICDXPASTMEDICALHX_GEN_ALL_CORE_FT
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

## 2025-04-18 NOTE — OCCUPATIONAL THERAPY INITIAL EVALUATION ADULT - PERTINENT HX OF CURRENT PROBLEM, REHAB EVAL
L knee OA which impacts pts ability to perform functional tasks/transfers and mobility. Pt is scheduled for L TKR on 5/1/25.

## 2025-04-18 NOTE — H&P PST ADULT - PROBLEM SELECTOR PLAN 1
Robotic assisted left total knee replacement.    Labs - CBC, BMP, PT/PTT, vitamin d, T&S, nose culture done   Medical and cardiac eval advised  Preop 3 day antibacterial wash  instruction reviewed and given, MRSA and MSSA screening done today.  Avoid NSAID and OTC supplements for 7 days   Advised to continue ASA 81 mg   Continue all prescribed meds as instructed  NPO after 11pm the day before surgery. Take medicines as advised the morning of surgery with sips of water.  verbalized understanding of all instructions.

## 2025-04-18 NOTE — H&P PST ADULT - HISTORY OF PRESENT ILLNESS
74 y/o male with pain in the left knee, scheduled for robotic assisted left total knee replacement on 5/1/25. Pre op testing today.  Goal: to be able to walk without pain 74 y/o male with pain in the left knee for many years , had few skiing accidents and injury to left knee, Seen by Dr Clemente, Had gel shots for the past 5 yrs in both the knees. Scheduled for robotic assisted left total knee replacement on 5/1/25. Pre op testing today.  Goal: to be able to walk without pain

## 2025-04-18 NOTE — H&P PST ADULT - SKIN
Subjective   Patient ID: Judah is a 7 year old . Primary historian: mother, patient  Chief Complaint   Patient presents with   • Pain     Complaints when urinating it's on and off it's random when to urgent care in December no uti   Is he holding urine? Does it hurt because he is squeezing penis?        HPI    Concerns today:  - intermittent complaints of discomfort when urinating. Patient reports it is at onset of voiding. No fevers, abdominal/flank pain. No nighttime enuresis, but will have sudden episodes where he will need to use the bathroom. Mom reports that he is constipated as well - hard round stools, no blood.   - needs albuterol refills. Denies daytime/nighttime coughing or asthma attacks in the last few months. Coughs more when he has URI  - still has episodes where he regurgitates/has \"spitups\" in his mouth of previously digested food. Will occur with acidic foods, gatorade, pizza, dairy. Will happen sometimes 10-20 minutes after he ate. Mom has been keeping a log of foods that exacerbate his symptoms. After trialing off acidic foods he does still have symptoms that recur easily, however. Has not yet been able to see GI due to some scheduling/location issues.  - interested in speaking with allergist for allergy testing     Review of Systems   Constitutional: Negative for appetite change and fever.   HENT: Negative for congestion and rhinorrhea.    Respiratory: Negative for cough.    Gastrointestinal: Positive for constipation and vomiting (regurgitation/reflux). Negative for abdominal distention, abdominal pain, blood in stool and diarrhea.   Genitourinary: Positive for dysuria. Negative for decreased urine volume, genital sores, hematuria, penile pain, testicular pain and urgency.   Skin: Negative for rash.       Patient's medications, allergies, past medical, surgical, social and family histories were reviewed and updated as appropriate.    Objective   Vitals:BP 85/49   Pulse 99   Temp 97.6 °F  (warm and dry/color normal/normal/no rashes/no ulcers

## 2025-04-18 NOTE — H&P PST ADULT - NSICDXPASTSURGICALHX_GEN_ALL_CORE_FT
PAST SURGICAL HISTORY:  Hallux rigidus, left great toe correction in 2011    S/P arthroscopy of left knee August2013    S/P CABG (coronary artery bypass graft) CABG x1 with repair of abdominal aortic aneurysm, and aortic valve replacement  on 10/4/13    S/P tonsillectomy as a child    S/P uvulopalatopharyngoplasty 14 years ago to correct sleep apnea     PAST SURGICAL HISTORY:  Hallux rigidus, left great toe correction in 2011    S/P arthroscopy of left knee August2013    S/P CABG (coronary artery bypass graft) CABG x1 with repair of abdominal aortic aneurysm, and aortic valve replacement  on 10/4/13    S/P lumbar fusion     S/P tonsillectomy as a child    S/P uvulopalatopharyngoplasty 14 years ago to correct sleep apnea     PAST SURGICAL HISTORY:  Hallux rigidus, left great toe correction in 2011    S/P aortic valve replacement     S/P arthroscopy of left knee August2013    S/P CABG (coronary artery bypass graft) CABG x1 with repair of abdominal aortic aneurysm, and aortic valve replacement  on 10/4/13    S/P lumbar fusion     S/P tonsillectomy as a child    S/P uvulopalatopharyngoplasty 14 years ago to correct sleep apnea

## 2025-04-28 RX ORDER — ACETAMINOPHEN 500 MG/5ML
650 LIQUID (ML) ORAL ONCE
Refills: 0 | Status: COMPLETED | OUTPATIENT
Start: 2025-05-01 | End: 2025-05-01

## 2025-04-30 RX ORDER — ROSUVASTATIN CALCIUM 20 MG/1
20 TABLET, FILM COATED ORAL AT BEDTIME
Refills: 0 | Status: DISCONTINUED | OUTPATIENT
Start: 2025-05-01 | End: 2025-05-02

## 2025-04-30 RX ORDER — OXYCODONE HYDROCHLORIDE 30 MG/1
5 TABLET ORAL
Refills: 0 | Status: DISCONTINUED | OUTPATIENT
Start: 2025-05-01 | End: 2025-05-02

## 2025-04-30 RX ORDER — ACETAMINOPHEN 500 MG/5ML
1000 LIQUID (ML) ORAL EVERY 8 HOURS
Refills: 0 | Status: DISCONTINUED | OUTPATIENT
Start: 2025-05-01 | End: 2025-05-02

## 2025-04-30 RX ORDER — TIZANIDINE 4 MG/1
4 TABLET ORAL AT BEDTIME
Refills: 0 | Status: DISCONTINUED | OUTPATIENT
Start: 2025-05-01 | End: 2025-05-02

## 2025-04-30 RX ORDER — BISACODYL 5 MG
10 TABLET, DELAYED RELEASE (ENTERIC COATED) ORAL ONCE
Refills: 0 | Status: DISCONTINUED | OUTPATIENT
Start: 2025-05-03 | End: 2025-05-02

## 2025-04-30 RX ORDER — LOSARTAN POTASSIUM 100 MG/1
50 TABLET, FILM COATED ORAL DAILY
Refills: 0 | Status: DISCONTINUED | OUTPATIENT
Start: 2025-05-01 | End: 2025-05-02

## 2025-04-30 RX ORDER — OXYCODONE HYDROCHLORIDE 30 MG/1
15 TABLET ORAL EVERY 4 HOURS
Refills: 0 | Status: DISCONTINUED | OUTPATIENT
Start: 2025-05-01 | End: 2025-05-02

## 2025-04-30 RX ORDER — MAGNESIUM HYDROXIDE 400 MG/5ML
30 SUSPENSION ORAL DAILY
Refills: 0 | Status: DISCONTINUED | OUTPATIENT
Start: 2025-05-01 | End: 2025-05-02

## 2025-04-30 RX ORDER — MELATONIN 5 MG
3 TABLET ORAL AT BEDTIME
Refills: 0 | Status: DISCONTINUED | OUTPATIENT
Start: 2025-05-01 | End: 2025-05-02

## 2025-04-30 RX ORDER — SENNA 187 MG
2 TABLET ORAL AT BEDTIME
Refills: 0 | Status: DISCONTINUED | OUTPATIENT
Start: 2025-05-01 | End: 2025-05-02

## 2025-04-30 RX ORDER — METOPROLOL SUCCINATE 50 MG/1
100 TABLET, EXTENDED RELEASE ORAL DAILY
Refills: 0 | Status: DISCONTINUED | OUTPATIENT
Start: 2025-05-01 | End: 2025-05-02

## 2025-04-30 RX ORDER — POLYETHYLENE GLYCOL 3350 17 G/17G
17 POWDER, FOR SOLUTION ORAL AT BEDTIME
Refills: 0 | Status: DISCONTINUED | OUTPATIENT
Start: 2025-05-01 | End: 2025-05-02

## 2025-04-30 RX ORDER — LEVOTHYROXINE SODIUM 300 MCG
150 TABLET ORAL DAILY
Refills: 0 | Status: DISCONTINUED | OUTPATIENT
Start: 2025-05-01 | End: 2025-05-02

## 2025-04-30 RX ORDER — OXYCODONE HYDROCHLORIDE 30 MG/1
10 TABLET ORAL
Refills: 0 | Status: DISCONTINUED | OUTPATIENT
Start: 2025-05-01 | End: 2025-05-02

## 2025-04-30 RX ORDER — ASPIRIN 325 MG
81 TABLET ORAL
Refills: 0 | Status: DISCONTINUED | OUTPATIENT
Start: 2025-05-02 | End: 2025-05-02

## 2025-04-30 RX ORDER — ONDANSETRON HCL/PF 4 MG/2 ML
4 VIAL (ML) INJECTION EVERY 6 HOURS
Refills: 0 | Status: DISCONTINUED | OUTPATIENT
Start: 2025-05-01 | End: 2025-05-02

## 2025-04-30 RX ORDER — B1/B2/B3/B5/B6/B12/VIT C/FOLIC 500-0.5 MG
1 TABLET ORAL DAILY
Refills: 0 | Status: DISCONTINUED | OUTPATIENT
Start: 2025-05-01 | End: 2025-05-02

## 2025-05-01 ENCOUNTER — INPATIENT (INPATIENT)
Facility: HOSPITAL | Age: 75
LOS: 0 days | Discharge: HOME HEALTH | End: 2025-05-02
Attending: ORTHOPAEDIC SURGERY | Admitting: ORTHOPAEDIC SURGERY
Payer: MEDICARE

## 2025-05-01 ENCOUNTER — APPOINTMENT (OUTPATIENT)
Dept: ORTHOPEDIC SURGERY | Facility: HOSPITAL | Age: 75
End: 2025-05-01

## 2025-05-01 ENCOUNTER — RESULT REVIEW (OUTPATIENT)
Age: 75
End: 2025-05-01

## 2025-05-01 ENCOUNTER — TRANSCRIPTION ENCOUNTER (OUTPATIENT)
Age: 75
End: 2025-05-01

## 2025-05-01 VITALS
HEIGHT: 70 IN | DIASTOLIC BLOOD PRESSURE: 90 MMHG | RESPIRATION RATE: 16 BRPM | SYSTOLIC BLOOD PRESSURE: 149 MMHG | TEMPERATURE: 98 F | WEIGHT: 212.08 LBS | OXYGEN SATURATION: 98 % | HEART RATE: 65 BPM

## 2025-05-01 DIAGNOSIS — Z95.1 PRESENCE OF AORTOCORONARY BYPASS GRAFT: Chronic | ICD-10-CM

## 2025-05-01 DIAGNOSIS — Z98.89 OTHER SPECIFIED POSTPROCEDURAL STATES: Chronic | ICD-10-CM

## 2025-05-01 DIAGNOSIS — Z95.2 PRESENCE OF PROSTHETIC HEART VALVE: Chronic | ICD-10-CM

## 2025-05-01 DIAGNOSIS — M20.22 HALLUX RIGIDUS, LEFT FOOT: Chronic | ICD-10-CM

## 2025-05-01 DIAGNOSIS — Z98.1 ARTHRODESIS STATUS: Chronic | ICD-10-CM

## 2025-05-01 LAB
ANION GAP SERPL CALC-SCNC: 6 MMOL/L — SIGNIFICANT CHANGE UP (ref 5–17)
BUN SERPL-MCNC: 22 MG/DL — SIGNIFICANT CHANGE UP (ref 7–23)
CALCIUM SERPL-MCNC: 8.4 MG/DL — LOW (ref 8.5–10.1)
CHLORIDE SERPL-SCNC: 112 MMOL/L — HIGH (ref 96–108)
CO2 SERPL-SCNC: 22 MMOL/L — SIGNIFICANT CHANGE UP (ref 22–31)
CREAT SERPL-MCNC: 1.23 MG/DL — SIGNIFICANT CHANGE UP (ref 0.5–1.3)
EGFR: 62 ML/MIN/1.73M2 — SIGNIFICANT CHANGE UP
EGFR: 62 ML/MIN/1.73M2 — SIGNIFICANT CHANGE UP
GLUCOSE BLDC GLUCOMTR-MCNC: 120 MG/DL — HIGH (ref 70–99)
GLUCOSE SERPL-MCNC: 102 MG/DL — HIGH (ref 70–99)
HCT VFR BLD CALC: 32.6 % — LOW (ref 39–50)
HGB BLD-MCNC: 10.7 G/DL — LOW (ref 13–17)
MCHC RBC-ENTMCNC: 27.5 PG — SIGNIFICANT CHANGE UP (ref 27–34)
MCHC RBC-ENTMCNC: 32.8 G/DL — SIGNIFICANT CHANGE UP (ref 32–36)
MCV RBC AUTO: 83.8 FL — SIGNIFICANT CHANGE UP (ref 80–100)
NRBC BLD AUTO-RTO: 0 /100 WBCS — SIGNIFICANT CHANGE UP (ref 0–0)
PLATELET # BLD AUTO: 114 K/UL — LOW (ref 150–400)
POTASSIUM SERPL-MCNC: 4.3 MMOL/L — SIGNIFICANT CHANGE UP (ref 3.5–5.3)
POTASSIUM SERPL-SCNC: 4.3 MMOL/L — SIGNIFICANT CHANGE UP (ref 3.5–5.3)
RBC # BLD: 3.89 M/UL — LOW (ref 4.2–5.8)
RBC # FLD: 13.3 % — SIGNIFICANT CHANGE UP (ref 10.3–14.5)
SODIUM SERPL-SCNC: 140 MMOL/L — SIGNIFICANT CHANGE UP (ref 135–145)
WBC # BLD: 5.5 K/UL — SIGNIFICANT CHANGE UP (ref 3.8–10.5)
WBC # FLD AUTO: 5.5 K/UL — SIGNIFICANT CHANGE UP (ref 3.8–10.5)

## 2025-05-01 PROCEDURE — 88305 TISSUE EXAM BY PATHOLOGIST: CPT | Mod: 26

## 2025-05-01 PROCEDURE — 20985 CPTR-ASST DIR MS PX: CPT

## 2025-05-01 PROCEDURE — 73560 X-RAY EXAM OF KNEE 1 OR 2: CPT | Mod: 26,LT

## 2025-05-01 PROCEDURE — 88311 DECALCIFY TISSUE: CPT | Mod: 26

## 2025-05-01 PROCEDURE — 27447 TOTAL KNEE ARTHROPLASTY: CPT | Mod: LT

## 2025-05-01 DEVICE — PATELLA TRIATHLON ASSYM SZ A3X10MM: Type: IMPLANTABLE DEVICE | Site: LEFT | Status: FUNCTIONAL

## 2025-05-01 DEVICE — TIBIAL COMPONENT: Type: IMPLANTABLE DEVICE | Site: LEFT | Status: FUNCTIONAL

## 2025-05-01 DEVICE — INSERT TIB BEARING CS X3 SZ 5 10MM: Type: IMPLANTABLE DEVICE | Site: LEFT | Status: FUNCTIONAL

## 2025-05-01 DEVICE — COMP FEM CR CMNTLSS BEADED W/ PA SZ 5 LT: Type: IMPLANTABLE DEVICE | Site: LEFT | Status: FUNCTIONAL

## 2025-05-01 DEVICE — MAKO BONE PIN 3.2MM X 140MM: Type: IMPLANTABLE DEVICE | Site: LEFT | Status: FUNCTIONAL

## 2025-05-01 DEVICE — MAKO BONE PIN 3.2MM X 110MM: Type: IMPLANTABLE DEVICE | Site: LEFT | Status: FUNCTIONAL

## 2025-05-01 RX ORDER — OMEPRAZOLE 20 MG/1
1 CAPSULE, DELAYED RELEASE ORAL
Refills: 0 | DISCHARGE

## 2025-05-01 RX ORDER — ACETAMINOPHEN 500 MG/5ML
1000 LIQUID (ML) ORAL ONCE
Refills: 0 | Status: COMPLETED | OUTPATIENT
Start: 2025-05-01 | End: 2025-05-01

## 2025-05-01 RX ORDER — ONDANSETRON HCL/PF 4 MG/2 ML
4 VIAL (ML) INJECTION ONCE
Refills: 0 | Status: DISCONTINUED | OUTPATIENT
Start: 2025-05-01 | End: 2025-05-01

## 2025-05-01 RX ORDER — SODIUM CHLORIDE 9 G/1000ML
1000 INJECTION, SOLUTION INTRAVENOUS
Refills: 0 | Status: DISCONTINUED | OUTPATIENT
Start: 2025-05-01 | End: 2025-05-01

## 2025-05-01 RX ORDER — HYDROMORPHONE/SOD CHLOR,ISO/PF 2 MG/10 ML
0.5 SYRINGE (ML) INJECTION
Refills: 0 | Status: DISCONTINUED | OUTPATIENT
Start: 2025-05-01 | End: 2025-05-01

## 2025-05-01 RX ORDER — METOPROLOL SUCCINATE 50 MG/1
1 TABLET, EXTENDED RELEASE ORAL
Refills: 0 | DISCHARGE

## 2025-05-01 RX ORDER — LEVOTHYROXINE SODIUM 300 MCG
1 TABLET ORAL
Refills: 0 | DISCHARGE

## 2025-05-01 RX ORDER — CHLORPHENIRAMINE MALEATE 4 MG
0 TABLET ORAL
Refills: 0 | DISCHARGE

## 2025-05-01 RX ORDER — DEXAMETHASONE 0.5 MG/1
10 TABLET ORAL ONCE
Refills: 0 | Status: COMPLETED | OUTPATIENT
Start: 2025-05-02 | End: 2025-05-02

## 2025-05-01 RX ORDER — CEFAZOLIN SODIUM IN 0.9 % NACL 3 G/100 ML
2000 INTRAVENOUS SOLUTION, PIGGYBACK (ML) INTRAVENOUS EVERY 8 HOURS
Refills: 0 | Status: COMPLETED | OUTPATIENT
Start: 2025-05-01 | End: 2025-05-02

## 2025-05-01 RX ORDER — LOSARTAN POTASSIUM 100 MG/1
1 TABLET, FILM COATED ORAL
Refills: 0 | DISCHARGE

## 2025-05-01 RX ORDER — MIRABEGRON 50 MG/1
1 TABLET, FILM COATED, EXTENDED RELEASE ORAL
Refills: 0 | DISCHARGE

## 2025-05-01 RX ORDER — TIZANIDINE 4 MG/1
2 TABLET ORAL
Refills: 0 | DISCHARGE

## 2025-05-01 RX ORDER — ROSUVASTATIN CALCIUM 5 MG/1
1 TABLET, FILM COATED ORAL
Refills: 0 | DISCHARGE

## 2025-05-01 RX ADMIN — TIZANIDINE 4 MILLIGRAM(S): 4 TABLET ORAL at 21:11

## 2025-05-01 RX ADMIN — Medication 2 TABLET(S): at 21:13

## 2025-05-01 RX ADMIN — SODIUM CHLORIDE 100 MILLILITER(S): 9 INJECTION, SOLUTION INTRAVENOUS at 14:53

## 2025-05-01 RX ADMIN — OXYCODONE HYDROCHLORIDE 10 MILLIGRAM(S): 30 TABLET ORAL at 18:35

## 2025-05-01 RX ADMIN — Medication 1000 MILLIGRAM(S): at 22:11

## 2025-05-01 RX ADMIN — Medication 650 MILLIGRAM(S): at 11:08

## 2025-05-01 RX ADMIN — OXYCODONE HYDROCHLORIDE 10 MILLIGRAM(S): 30 TABLET ORAL at 19:35

## 2025-05-01 RX ADMIN — Medication 100 MILLIGRAM(S): at 21:12

## 2025-05-01 RX ADMIN — Medication 500 MILLILITER(S): at 14:53

## 2025-05-01 RX ADMIN — POLYETHYLENE GLYCOL 3350 17 GRAM(S): 17 POWDER, FOR SOLUTION ORAL at 21:14

## 2025-05-01 RX ADMIN — Medication 400 MILLIGRAM(S): at 21:11

## 2025-05-01 RX ADMIN — ROSUVASTATIN CALCIUM 20 MILLIGRAM(S): 20 TABLET, FILM COATED ORAL at 21:12

## 2025-05-01 NOTE — ASU PATIENT PROFILE, ADULT - AS SC BRADEN MOISTURE
(4) rarely moist Aklief Pregnancy And Lactation Text: It is unknown if this medication is safe to use during pregnancy.  It is unknown if this medication is excreted in breast milk.  Breastfeeding women should use the topical cream on the smallest area of the skin for the shortest time needed while breastfeeding.  Do not apply to nipple and areola.

## 2025-05-01 NOTE — OCCUPATIONAL THERAPY INITIAL EVALUATION ADULT - ADDITIONAL COMMENTS
Pt lives with spouse (Who can assist post op) in a private house with 3 steps to enter with bilateral handrail (close to reach). Once inside, the pt bedroom and bathroom is on that floor when entering. The pts bathroom has a walk in shower stall, fixed/retractable shower head, comfort height toilet seat and grab bars +. The pt ambulates with no device and does not own any device for ambulation. The pt wears glasses all the time, R handed, drives and has no hearing impairments.

## 2025-05-01 NOTE — DISCHARGE NOTE PROVIDER - NSDCMRMEDTOKEN_GEN_ALL_CORE_FT
acetaminophen 325 mg oral tablet: 2 tab(s) orally every 6 hours, As needed, Moderate Pain  Advil 200 mg oral tablet: 1 tab(s) orally prn  aspirin 81 mg oral tablet: 1 orally once a day (at bedtime)  cholecalciferol 10 mcg/0.25 mL (400 intl units/0.25 mL) oral liquid: 0.25 milliliter(s) orally once a day  levothyroxine 150 mcg (0.15 mg) oral tablet: 1 tab(s) orally once a day  losartan 50 mg oral tablet: 1 tab(s) orally once a day  metoprolol succinate 100 mg oral capsule, extended release: 1 cap(s) orally once a day (at bedtime)  Myrbetriq 50 mg oral tablet, extended release: 1 tab(s) orally once a day  omeprazole 20 mg oral delayed release tablet: 1 tab(s) orally once a day (at bedtime)  rosuvastatin 20 mg oral capsule: 1 cap(s) orally once a day (at bedtime)  tirzepatide: 1 once a week  tiZANidine 4 mg oral tablet: 2 tab(s) orally once a day (at bedtime)   acetaminophen 500 mg oral tablet: 2 tab(s) orally every 8 hours  Aspirin Low Dose 81 mg oral delayed release tablet: 1 tab(s) orally 2 times a day MDD: 2  cholecalciferol 10 mcg/0.25 mL (400 intl units/0.25 mL) oral liquid: 0.25 milliliter(s) orally once a day  levothyroxine 150 mcg (0.15 mg) oral tablet: 1 tab(s) orally once a day  losartan 50 mg oral tablet: 1 tab(s) orally once a day  metoprolol succinate 100 mg oral capsule, extended release: 1 cap(s) orally once a day (at bedtime)  Myrbetriq 50 mg oral tablet, extended release: 1 tab(s) orally once a day  Narcan 4 mg/0.1 mL nasal spray: 4 milligram(s) intranasally once , repeat as necessary.   As needed. For suspected opiate overdose   Follow instructions on packet MDD: 0.2 ml  omeprazole 20 mg oral delayed release tablet: 1 tab(s) orally once a day (at bedtime)  oxyCODONE 10 mg oral tablet: 1 tab(s) orally every 4 hours as needed for  pain MDD: 6  polyethylene glycol 3350 oral powder for reconstitution: 17 gram(s) orally once a day (at bedtime)  rosuvastatin 20 mg oral capsule: 1 cap(s) orally once a day (at bedtime)  senna leaf extract oral tablet: 2 tab(s) orally once a day (at bedtime)  tirzepatide: 1 application injectable once a week  tiZANidine 4 mg oral tablet: 2 tab(s) orally once a day (at bedtime)

## 2025-05-01 NOTE — DISCHARGE NOTE PROVIDER - NSDCFUSCHEDAPPT_GEN_ALL_CORE_FT
[facility]  [facility] [address]  Scheduled Appointment: 05/13/2025

## 2025-05-01 NOTE — PATIENT PROFILE ADULT - FALL HARM RISK - HARM RISK INTERVENTIONS

## 2025-05-01 NOTE — DISCHARGE NOTE PROVIDER - CARE PROVIDER_API CALL
Black Clemente.  Orthopaedic Surgery  [address] [facility] [address] [place] [id number]  Phone: [phone]  Fax: [phone]  Follow Up Time:

## 2025-05-01 NOTE — DISCHARGE NOTE PROVIDER - NSDCFUADDAPPT_GEN_ALL_CORE_FT

## 2025-05-01 NOTE — PHYSICAL THERAPY INITIAL EVALUATION ADULT - GENERAL OBSERVATIONS, REHAB EVAL
Chart (EMR) reviewed. Received supine c HOB elevated, NAD. +heplock, +dressing c ace wrap to left knee intact, +B/L SCD's donned. Alert. Ox4. Able to follow multistep commands/directions. Educated c knee precautions and provided c TKA packet and green strap.

## 2025-05-01 NOTE — CONSULT NOTE ADULT - SUBJECTIVE AND OBJECTIVE BOX
ELIZA ACOSTA is a 75y Male s/p ROBOTIC ASSISTED LEFT TOTAL KNEE ARTHROPLASTY WITH JORGE      w/ h/o Hypothyroidism    Hyperlipidemia    HTN (hypertension)    Neuropathy of leg    Hay fever    Sleep apnea    Abdominal aortic aneurysm    Aortic valve disorder      denies any chest pain shortness of breath palpitation dizziness lightheadedness nausea vomiting fever or chills    S/P CABG (coronary artery bypass graft)    S/P tonsillectomy    S/P arthroscopy of left knee    S/P uvulopalatopharyngoplasty    Hallux rigidus, left    S/P lumbar fusion    S/P aortic valve replacement      FH: kidney cancer (Father)    FH: heart disease (Mother)      SH: doesnot smoke or drink at this time    Seafood (Vomiting)  No Known Drug Allergies    acetaminophen     Tablet .. 1000 milliGRAM(s) Oral every 8 hours  aspirin enteric coated 81 milliGRAM(s) Oral two times a day  levothyroxine 150 MICROGram(s) Oral daily  losartan 50 milliGRAM(s) Oral daily  magnesium hydroxide Suspension 30 milliLiter(s) Oral daily PRN  melatonin 3 milliGRAM(s) Oral at bedtime PRN  metoprolol succinate  milliGRAM(s) Oral daily  multivitamin 1 Tablet(s) Oral daily  ondansetron Injectable 4 milliGRAM(s) IV Push every 6 hours PRN  oxyCODONE    IR 15 milliGRAM(s) Oral every 4 hours PRN  oxyCODONE    IR 5 milliGRAM(s) Oral every 3 hours PRN  oxyCODONE    IR 10 milliGRAM(s) Oral every 3 hours PRN  pantoprazole    Tablet 40 milliGRAM(s) Oral before breakfast  polyethylene glycol 3350 17 Gram(s) Oral at bedtime  rosuvastatin 20 milliGRAM(s) Oral at bedtime  senna 2 Tablet(s) Oral at bedtime  sodium chloride 0.9%. 1000 milliLiter(s) IV Continuous <Continuous>  tiZANidine 4 milliGRAM(s) Oral at bedtime    T(C): 36.6 (05-02-25 @ 12:00), Max: 36.7 (05-01-25 @ 21:30)  HR: 63 (05-02-25 @ 12:00) (59 - 75)  BP: 105/62 (05-02-25 @ 12:00) (105/62 - 159/78)  RR: 18 (05-02-25 @ 12:00) (12 - 19)  SpO2: 95% (05-02-25 @ 12:00) (93% - 97%)  HEENT unremarkable  neck no JVD or bruit  heart normal S1 S2 RRR no gallops or rubs  chest clear to auscultation  abd sof nontender non distended +bs  ext no calf tenderness    A/P   DVT PX  pain control  bowel regimen   wound care as per ortho  GI PX  antiemetics prn  incentive spirometer

## 2025-05-01 NOTE — OCCUPATIONAL THERAPY INITIAL EVALUATION ADULT - STRENGTHENING, PT EVAL
Paperwork signed by provider, scanned back to Disability Department.Paperwork signed by provider, scanned back to Disability Department.   Pt will show improvement in L LE strength by 1-2 grades in order to improve balance and the performance of ADLs and functional transfers.

## 2025-05-01 NOTE — ASU PATIENT PROFILE, ADULT - NSICDXPASTSURGICALHX_GEN_ALL_CORE_FT
PAST SURGICAL HISTORY:  Hallux rigidus, left great toe correction in 2011    S/P aortic valve replacement     S/P arthroscopy of left knee August2013    S/P CABG (coronary artery bypass graft) CABG x1 with repair of abdominal aortic aneurysm, and aortic valve replacement  on 10/4/13    S/P lumbar fusion     S/P tonsillectomy as a child    S/P uvulopalatopharyngoplasty 14 years ago to correct sleep apnea

## 2025-05-01 NOTE — PHYSICAL THERAPY INITIAL EVALUATION ADULT - ADDITIONAL COMMENTS
Confirmed from pre-op notes: Pt lives with spouse (Who can assist post op) in a private house with 3 steps to enter with bilateral handrail (close to reach). Once inside, the pt bedroom and bathroom is on that floor when entering. The pts bathroom has a walk in shower stall, fixed/retractable shower head, comfort height toilet seat and grab bars +. Pt was Independent c all ADL's and ambulates with no device.

## 2025-05-01 NOTE — DISCHARGE NOTE PROVIDER - HOSPITAL COURSE
75yMale with history of osteoarthritis left knee presenting for left TKA by Dr. Black Clemente on 5/1/25. Risk and benefits of surgery were explained to the patient. The patient understood and agreed to proceed with surgery. Patient underwent the procedure with no intraoperative complications. Pt was brought in stable condition to the PACU. Once stable in PACU, pt was brought to the floor. During hospital stay pt was followed by Medicine,  during this admission. Pt hospital course was XX. Pt is stable for discharge to XX on POD# 75yMale with history of osteoarthritis left knee presenting for left TKA by Dr. Black Clemente on 5/1/25. Risk and benefits of surgery were explained to the patient. The patient understood and agreed to proceed with surgery. Patient underwent the procedure with no intraoperative complications. Pt was brought in stable condition to the PACU. Once stable in PACU, pt was brought to the floor. During hospital stay pt was followed by Medicine,  during this admission. Pt hospital course was unremarkable. Pt is stable for discharge to home on POD# 1

## 2025-05-01 NOTE — DISCHARGE NOTE PROVIDER - NSDCFUADDINST_GEN_ALL_CORE_FT
Keep knee straight while at rest. Elevate the leg as much as possible ("toes above the nose") to help control swelling. Make sure you get up and take a brief walk every two hours to help with circulation and prevent stiffness. Incentive spirometer 10X/hour. Cryocuff to help with pain/inflammation.   Per Dr. Clemente: may advance from walker as tolerated per discretion of physical therapist.   Keep Prineo Dressing Clean, Dry and Intact. May shower with Prineo Dressing. Please do not scrub, soak, peel or pick at the prineo dressing. No creams, lotions, or oils over dressing. May shower and let water run over incision, no baths. Pat dry once out of shower. Dressing to be removed in office at follow up visit in 2 weeks. There are no staples or stitches that need to be removed.

## 2025-05-01 NOTE — ASU PATIENT PROFILE, ADULT - IS PATIENT PREGNANT?
20 yo m, healthy, no PMHx, presented with  c/o sharp RLQ abdominal pain, 10/10, asso with nausea, had and episode of vomiting. Dx with acute appendicitis        Acute Appendicitis  CT Abd/pelvis reports no bowel obstruction. 10 mm fluid-filled appendix with minimal inflammatory changes in the adjacent fat. Findings are suspicious for early appendicitis.   NPO, IV hydration while NPO  IV Antibx-Ceftriaxone  Analgesics prn  GI prophylaxis  DVT proph- early ambulation   Surg eval - DR Sebastian was notified by ED      Mother, Jeanie, cell (first) [phone], then home [phone]     20 yo m, healthy, no PMHx, presented with  c/o sharp RLQ abdominal pain, 10/10, asso with nausea, had and episode of vomiting. Dx with acute appendicitis        Acute Appendicitis  CT Abd/pelvis reports no bowel obstruction. 10 mm fluid-filled appendix with minimal inflammatory changes in the adjacent fat. Findings are suspicious for early appendicitis.  NPO, IV hydration while NPO  IV Antibx-Ceftriaxone  Analgesics prn  GI prophylaxis  DVT proph- early ambulation   Surg eval - DR Sebastian LAp appendectomy today      7/15 Mother, Jeanie, cell (first) [phone], then home [phone]- at bedside updated on plan of care  dispo anticipate discharge later today     not applicable (Male)

## 2025-05-02 ENCOUNTER — TRANSCRIPTION ENCOUNTER (OUTPATIENT)
Age: 75
End: 2025-05-02

## 2025-05-02 VITALS
RESPIRATION RATE: 18 BRPM | TEMPERATURE: 98 F | HEART RATE: 63 BPM | OXYGEN SATURATION: 95 % | DIASTOLIC BLOOD PRESSURE: 62 MMHG | SYSTOLIC BLOOD PRESSURE: 105 MMHG

## 2025-05-02 LAB
ANION GAP SERPL CALC-SCNC: 6 MMOL/L — SIGNIFICANT CHANGE UP (ref 5–17)
BUN SERPL-MCNC: 27 MG/DL — HIGH (ref 7–23)
CALCIUM SERPL-MCNC: 8.1 MG/DL — LOW (ref 8.5–10.1)
CHLORIDE SERPL-SCNC: 110 MMOL/L — HIGH (ref 96–108)
CO2 SERPL-SCNC: 22 MMOL/L — SIGNIFICANT CHANGE UP (ref 22–31)
CREAT SERPL-MCNC: 1.21 MG/DL — SIGNIFICANT CHANGE UP (ref 0.5–1.3)
EGFR: 63 ML/MIN/1.73M2 — SIGNIFICANT CHANGE UP
EGFR: 63 ML/MIN/1.73M2 — SIGNIFICANT CHANGE UP
GLUCOSE SERPL-MCNC: 127 MG/DL — HIGH (ref 70–99)
HCT VFR BLD CALC: 31.9 % — LOW (ref 39–50)
HGB BLD-MCNC: 10.2 G/DL — LOW (ref 13–17)
MCHC RBC-ENTMCNC: 26.9 PG — LOW (ref 27–34)
MCHC RBC-ENTMCNC: 32 G/DL — SIGNIFICANT CHANGE UP (ref 32–36)
MCV RBC AUTO: 84.2 FL — SIGNIFICANT CHANGE UP (ref 80–100)
NRBC BLD AUTO-RTO: 0 /100 WBCS — SIGNIFICANT CHANGE UP (ref 0–0)
PLATELET # BLD AUTO: 120 K/UL — LOW (ref 150–400)
POTASSIUM SERPL-MCNC: 4.1 MMOL/L — SIGNIFICANT CHANGE UP (ref 3.5–5.3)
POTASSIUM SERPL-SCNC: 4.1 MMOL/L — SIGNIFICANT CHANGE UP (ref 3.5–5.3)
RBC # BLD: 3.79 M/UL — LOW (ref 4.2–5.8)
RBC # FLD: 13.3 % — SIGNIFICANT CHANGE UP (ref 10.3–14.5)
SODIUM SERPL-SCNC: 138 MMOL/L — SIGNIFICANT CHANGE UP (ref 135–145)
WBC # BLD: 11.9 K/UL — HIGH (ref 3.8–10.5)
WBC # FLD AUTO: 11.9 K/UL — HIGH (ref 3.8–10.5)

## 2025-05-02 RX ORDER — OXYCODONE HYDROCHLORIDE 30 MG/1
1 TABLET ORAL
Qty: 42 | Refills: 0
Start: 2025-05-02 | End: 2025-05-08

## 2025-05-02 RX ORDER — ASPIRIN 325 MG
1 TABLET ORAL
Refills: 0 | DISCHARGE

## 2025-05-02 RX ORDER — ASPIRIN 325 MG
1 TABLET ORAL
Qty: 60 | Refills: 0
Start: 2025-05-02 | End: 2025-05-31

## 2025-05-02 RX ORDER — ACETAMINOPHEN 500 MG/5ML
2 LIQUID (ML) ORAL
Qty: 0 | Refills: 0 | DISCHARGE
Start: 2025-05-02

## 2025-05-02 RX ORDER — SENNA 187 MG
2 TABLET ORAL
Qty: 0 | Refills: 0 | DISCHARGE
Start: 2025-05-02

## 2025-05-02 RX ORDER — POLYETHYLENE GLYCOL 3350 17 G/17G
17 POWDER, FOR SOLUTION ORAL
Qty: 0 | Refills: 0 | DISCHARGE
Start: 2025-05-02

## 2025-05-02 RX ORDER — NALOXONE HYDROCHLORIDE 0.4 MG/ML
4 INJECTION, SOLUTION INTRAMUSCULAR; INTRAVENOUS; SUBCUTANEOUS
Qty: 1 | Refills: 0
Start: 2025-05-02 | End: 2025-05-02

## 2025-05-02 RX ORDER — TIRZEPATIDE 7.5 MG/.5ML
1 INJECTION, SOLUTION SUBCUTANEOUS
Qty: 0 | Refills: 0 | DISCHARGE

## 2025-05-02 RX ORDER — IBUPROFEN 200 MG
1 TABLET ORAL
Refills: 0 | DISCHARGE

## 2025-05-02 RX ADMIN — METOPROLOL SUCCINATE 100 MILLIGRAM(S): 50 TABLET, EXTENDED RELEASE ORAL at 05:55

## 2025-05-02 RX ADMIN — OXYCODONE HYDROCHLORIDE 10 MILLIGRAM(S): 30 TABLET ORAL at 11:13

## 2025-05-02 RX ADMIN — OXYCODONE HYDROCHLORIDE 10 MILLIGRAM(S): 30 TABLET ORAL at 12:10

## 2025-05-02 RX ADMIN — Medication 150 MICROGRAM(S): at 05:54

## 2025-05-02 RX ADMIN — Medication 40 MILLIGRAM(S): at 05:55

## 2025-05-02 RX ADMIN — Medication 120 MILLILITER(S): at 05:56

## 2025-05-02 RX ADMIN — OXYCODONE HYDROCHLORIDE 10 MILLIGRAM(S): 30 TABLET ORAL at 05:53

## 2025-05-02 RX ADMIN — Medication 1 TABLET(S): at 11:04

## 2025-05-02 RX ADMIN — Medication 1000 MILLIGRAM(S): at 06:53

## 2025-05-02 RX ADMIN — OXYCODONE HYDROCHLORIDE 10 MILLIGRAM(S): 30 TABLET ORAL at 06:53

## 2025-05-02 RX ADMIN — Medication 100 MILLIGRAM(S): at 05:54

## 2025-05-02 RX ADMIN — Medication 81 MILLIGRAM(S): at 05:54

## 2025-05-02 RX ADMIN — Medication 1000 MILLIGRAM(S): at 05:53

## 2025-05-02 RX ADMIN — OXYCODONE HYDROCHLORIDE 10 MILLIGRAM(S): 30 TABLET ORAL at 01:19

## 2025-05-02 RX ADMIN — OXYCODONE HYDROCHLORIDE 10 MILLIGRAM(S): 30 TABLET ORAL at 00:19

## 2025-05-02 RX ADMIN — DEXAMETHASONE 102 MILLIGRAM(S): 0.5 TABLET ORAL at 05:56

## 2025-05-02 RX ADMIN — Medication 1000 MILLIGRAM(S): at 13:49

## 2025-05-02 RX ADMIN — Medication 1000 MILLIGRAM(S): at 14:40

## 2025-05-02 RX ADMIN — LOSARTAN POTASSIUM 50 MILLIGRAM(S): 100 TABLET, FILM COATED ORAL at 05:55

## 2025-05-02 NOTE — PROGRESS NOTE ADULT - SUBJECTIVE AND OBJECTIVE BOX
ELIZA ACOSTA is a 75y Male s/p ROBOTIC ASSISTED LEFT TOTAL KNEE ARTHROPLASTY WITH JORGE        denies any chest pain shortness of breath palpitation dizziness lightheadedness nausea vomiting fever or chills    T(C): 36.6 (05-02-25 @ 12:00), Max: 36.7 (05-01-25 @ 21:30)  HR: 63 (05-02-25 @ 12:00) (59 - 75)  BP: 105/62 (05-02-25 @ 12:00) (105/62 - 159/78)  RR: 18 (05-02-25 @ 12:00) (12 - 19)  SpO2: 95% (05-02-25 @ 12:00) (93% - 97%)  no jvd/bruit  s1 s2 rrr  cta  s/nt/nd  no calf tend                        10.2   11.90 )-----------( 120      ( 02 May 2025 06:02 )             31.9   05-02    138  |  110[H]  |  27[H]  ----------------------------<  127[H]  4.1   |  22  |  1.21    Ca    8.1[L]      02 May 2025 06:02        cont dvt px  pain control  bowel regimen  antiemetics  incentive spirometer
Patient is seen and examined at bedside. Denies CP/SOB/Dizziness/N/V/D/HA. Pain is controlled.     Vital Signs Last 24 Hrs  T(C): 36.4 (01 May 2025 15:30), Max: 36.6 (01 May 2025 10:35)  T(F): 97.6 (01 May 2025 15:30), Max: 97.9 (01 May 2025 10:35)  HR: 59 (01 May 2025 15:30) (59 - 72)  BP: 113/72 (01 May 2025 15:30) (111/68 - 149/90)  BP(mean): 85 (01 May 2025 15:30) (81 - 85)  RR: 18 (01 May 2025 15:30) (12 - 18)  SpO2: 97% (01 May 2025 15:30) (94% - 98%)    Parameters below as of 01 May 2025 15:30  Patient On (Oxygen Delivery Method): room air          PHYSICAL EXAM:  General: NAD  Neuro:  Alert & responsive  HEENT: NCAT, EOMI, conjunctiva clear  abd: soft, NT/ND   Right LE: Motor intact + EHL/FHL/TA/GS.  Sensation is grossly intact.  Extremity warm, compartments soft, compressible. No calf tenderness. DP 2+   Left LE: ACE bandage C/D/I. Motor intact +EHL/FHL/TA/GS. Sensation is grossly intact. Extremity warm, compartments soft, compressible. No calf tenderness. DP2+    Labs:                          10.7   5.50  )-----------( 114      ( 01 May 2025 15:00 )             32.6       05-01    140  |  112[H]  |  22  ----------------------------<  102[H]  4.3   |  22  |  1.23    Ca    8.4[L]      01 May 2025 15:00        A/P: Patient is a 75y y/o Male s/p left TKA, POD # 0  -wound care, knee extension/leg elevation, cryocuff, isometric exercises, new medications reviewed with pt  -Pain control/analgesia reviewed   -Inc spirometry reviewed with pt, demonstrated competence  -DVT prophylaxis with Venodynes/Aspirin  -Pt requires a rolling walker for MRADLs at home  -F/U AM Labs  -PT/OT/WBAT  -complete prophylactic Antibiotic  -medical consult pending   -DC planning: for home tomorrow with home care      
ELIZA ACOSTA is a 75y Male s/p ROBOTIC ASSISTED LEFT TOTAL KNEE ARTHROPLASTY WITH JORGE        denies any chest pain shortness of breath palpitation dizziness lightheadedness nausea vomiting fever or chills    T(C): 36.6 (05-02-25 @ 12:00), Max: 36.7 (05-01-25 @ 21:30)  HR: 63 (05-02-25 @ 12:00) (59 - 75)  BP: 105/62 (05-02-25 @ 12:00) (105/62 - 159/78)  RR: 18 (05-02-25 @ 12:00) (12 - 19)  SpO2: 95% (05-02-25 @ 12:00) (93% - 97%)  no jvd/bruit  s1 s2 rrr  cta  s/nt/nd  no calf tend                        10.2   11.90 )-----------( 120      ( 02 May 2025 06:02 )             31.9   05-02    138  |  110[H]  |  27[H]  ----------------------------<  127[H]  4.1   |  22  |  1.21    Ca    8.1[L]      02 May 2025 06:02        cont dvt px  pain control  bowel regimen  antiemetics  incentive spirometer
Patient is seen and examined at bedside. Denies CP/SOB/Dizziness/N/V/D/HA. Pain is controlled.     Vital Signs Last 24 Hrs  T(C): 36.5 (02 May 2025 05:30), Max: 36.7 (01 May 2025 21:30)  T(F): 97.7 (02 May 2025 05:30), Max: 98 (01 May 2025 21:30)  HR: 63 (02 May 2025 05:30) (59 - 75)  BP: 123/71 (02 May 2025 05:30) (110/67 - 159/78)  BP(mean): 85 (01 May 2025 15:30) (81 - 85)  RR: 18 (02 May 2025 05:30) (12 - 18)  SpO2: 94% (02 May 2025 05:30) (93% - 97%)    Parameters below as of 02 May 2025 05:30  Patient On (Oxygen Delivery Method): room air          PHYSICAL EXAM:  General: NAD  Neuro:  Alert & responsive  HEENT: NCAT, EOMI, conjunctiva clear  abd: soft, NT/ND  Right LE: Motor intact + EHL/FHL/TA/GS.  Sensation is grossly intact.  Extremity warm, compartments soft, compressible. No calf tenderness. DP 2+   Left LE: +Ecchymosis anterior knee with swelling. ROM to 70*. Motor intact +EHL/FHL/TA/GS. Sensation is grossly intact. Extremity warm, compartments soft, compressible. No calf tenderness. DP2+    Labs:                          10.2   11.90 )-----------( 120      ( 02 May 2025 06:02 )             31.9       05-02    138  |  110[H]  |  27[H]  ----------------------------<  127[H]  4.1   |  22  |  1.21    Ca    8.1[L]      02 May 2025 06:02        A/P: Patient is a 75y y/o Male s/p left TKA, POD # 1  -wound care, knee extension/leg elevation, cryocuff, isometric exercises, new medications reviewed with pt  -Pain control/analgesia reviewed   -Inc spirometry reviewed with pt, demonstrated competence  -DVT prophylaxis with Venodynes/Aspirin  -Pt requires a rolling walker for MRADLs at home  -PT/OT/WBAT  -medical consult reviewed   -DC planning: for home today with home care  -D/W DR Clemente

## 2025-05-02 NOTE — DISCHARGE NOTE NURSING/CASE MANAGEMENT/SOCIAL WORK - PATIENT PORTAL LINK FT
You can access the FollowMyHealth Patient Portal offered by [facility] by registering at the following website: [email or web address]. By joining [employer]’s FollowMyHealth portal, you will also be able to view your health information using other applications (apps) compatible with our system.

## 2025-05-02 NOTE — DISCHARGE NOTE NURSING/CASE MANAGEMENT/SOCIAL WORK - FINANCIAL ASSISTANCE
[facility] provides services at a reduced cost to those who are determined to be eligible through [facility]’s financial assistance program. Information regarding [facility]’s financial assistance program can be found by going to [email or web address] or by calling [phone].

## 2025-05-06 LAB — SURGICAL PATHOLOGY STUDY: SIGNIFICANT CHANGE UP

## 2025-05-08 ENCOUNTER — NON-APPOINTMENT (OUTPATIENT)
Age: 75
End: 2025-05-08

## 2025-05-09 ENCOUNTER — EMERGENCY (EMERGENCY)
Facility: HOSPITAL | Age: 75
LOS: 0 days | Discharge: HOME | End: 2025-05-09
Attending: EMERGENCY MEDICINE
Payer: MEDICARE

## 2025-05-09 VITALS
OXYGEN SATURATION: 100 % | SYSTOLIC BLOOD PRESSURE: 126 MMHG | TEMPERATURE: 99 F | RESPIRATION RATE: 18 BRPM | DIASTOLIC BLOOD PRESSURE: 65 MMHG | HEART RATE: 68 BPM

## 2025-05-09 VITALS
HEIGHT: 70.5 IN | RESPIRATION RATE: 26 BRPM | OXYGEN SATURATION: 93 % | TEMPERATURE: 98 F | DIASTOLIC BLOOD PRESSURE: 58 MMHG | HEART RATE: 185 BPM | WEIGHT: 210.1 LBS | SYSTOLIC BLOOD PRESSURE: 90 MMHG

## 2025-05-09 DIAGNOSIS — R79.1 ABNORMAL COAGULATION PROFILE: ICD-10-CM

## 2025-05-09 DIAGNOSIS — Q25.46 TORTUOUS AORTIC ARCH: ICD-10-CM

## 2025-05-09 DIAGNOSIS — Z98.89 OTHER SPECIFIED POSTPROCEDURAL STATES: Chronic | ICD-10-CM

## 2025-05-09 DIAGNOSIS — R91.8 OTHER NONSPECIFIC ABNORMAL FINDING OF LUNG FIELD: ICD-10-CM

## 2025-05-09 DIAGNOSIS — M25.562 PAIN IN LEFT KNEE: ICD-10-CM

## 2025-05-09 DIAGNOSIS — Z79.82 LONG TERM (CURRENT) USE OF ASPIRIN: ICD-10-CM

## 2025-05-09 DIAGNOSIS — M20.22 HALLUX RIGIDUS, LEFT FOOT: Chronic | ICD-10-CM

## 2025-05-09 DIAGNOSIS — Z96.652 PRESENCE OF LEFT ARTIFICIAL KNEE JOINT: ICD-10-CM

## 2025-05-09 DIAGNOSIS — I10 ESSENTIAL (PRIMARY) HYPERTENSION: ICD-10-CM

## 2025-05-09 DIAGNOSIS — Z95.2 PRESENCE OF PROSTHETIC HEART VALVE: Chronic | ICD-10-CM

## 2025-05-09 DIAGNOSIS — R50.9 FEVER, UNSPECIFIED: ICD-10-CM

## 2025-05-09 DIAGNOSIS — Z95.1 PRESENCE OF AORTOCORONARY BYPASS GRAFT: Chronic | ICD-10-CM

## 2025-05-09 DIAGNOSIS — Z98.1 ARTHRODESIS STATUS: Chronic | ICD-10-CM

## 2025-05-09 LAB
ALBUMIN SERPL ELPH-MCNC: 3.2 G/DL — LOW (ref 3.3–5)
ALP SERPL-CCNC: 75 U/L — SIGNIFICANT CHANGE UP (ref 40–120)
ALT FLD-CCNC: 58 U/L — SIGNIFICANT CHANGE UP (ref 12–78)
ANION GAP SERPL CALC-SCNC: 10 MMOL/L — SIGNIFICANT CHANGE UP (ref 5–17)
APPEARANCE UR: CLEAR — SIGNIFICANT CHANGE UP
AST SERPL-CCNC: 51 U/L — HIGH (ref 15–37)
B PERT IGG+IGM PNL SER: ABNORMAL
BASOPHILS # BLD AUTO: 0.05 K/UL — SIGNIFICANT CHANGE UP (ref 0–0.2)
BASOPHILS NFR BLD AUTO: 0.7 % — SIGNIFICANT CHANGE UP (ref 0–2)
BILIRUB SERPL-MCNC: 0.8 MG/DL — SIGNIFICANT CHANGE UP (ref 0.2–1.2)
BILIRUB UR-MCNC: NEGATIVE — SIGNIFICANT CHANGE UP
BUN SERPL-MCNC: 28 MG/DL — HIGH (ref 7–23)
CALCIUM SERPL-MCNC: 8.7 MG/DL — SIGNIFICANT CHANGE UP (ref 8.5–10.1)
CHLORIDE SERPL-SCNC: 102 MMOL/L — SIGNIFICANT CHANGE UP (ref 96–108)
CK SERPL-CCNC: 149 U/L — SIGNIFICANT CHANGE UP (ref 26–308)
CO2 SERPL-SCNC: 23 MMOL/L — SIGNIFICANT CHANGE UP (ref 22–31)
COLOR FLD: SIGNIFICANT CHANGE UP
COLOR SPEC: YELLOW — SIGNIFICANT CHANGE UP
CREAT SERPL-MCNC: 1.62 MG/DL — HIGH (ref 0.5–1.3)
D DIMER BLD IA.RAPID-MCNC: 1628 NG/ML DDU — HIGH
DIFF PNL FLD: NEGATIVE — SIGNIFICANT CHANGE UP
EGFR: 45 ML/MIN/1.73M2 — LOW
EGFR: 45 ML/MIN/1.73M2 — LOW
EOSINOPHIL # BLD AUTO: 0.22 K/UL — SIGNIFICANT CHANGE UP (ref 0–0.5)
EOSINOPHIL NFR BLD AUTO: 3.1 % — SIGNIFICANT CHANGE UP (ref 0–6)
FLUAV AG NPH QL: SIGNIFICANT CHANGE UP
FLUBV AG NPH QL: SIGNIFICANT CHANGE UP
GLUCOSE SERPL-MCNC: 96 MG/DL — SIGNIFICANT CHANGE UP (ref 70–99)
GLUCOSE UR QL: NEGATIVE MG/DL — SIGNIFICANT CHANGE UP
GRAM STN FLD: SIGNIFICANT CHANGE UP
GRAM STN FLD: SIGNIFICANT CHANGE UP
HCT VFR BLD CALC: 29.7 % — LOW (ref 39–50)
HGB BLD-MCNC: 10.2 G/DL — LOW (ref 13–17)
IMM GRANULOCYTES NFR BLD AUTO: 1.1 % — HIGH (ref 0–0.9)
JOINT PATHOGENS PNL SNV NAA+NON-PROBE: SIGNIFICANT CHANGE UP
JOINT PCR SOURCE: SIGNIFICANT CHANGE UP
KETONES UR-MCNC: NEGATIVE MG/DL — SIGNIFICANT CHANGE UP
LACTATE SERPL-SCNC: 0.7 MMOL/L — SIGNIFICANT CHANGE UP (ref 0.7–2)
LACTATE SERPL-SCNC: 2.1 MMOL/L — HIGH (ref 0.7–2)
LEUKOCYTE ESTERASE UR-ACNC: NEGATIVE — SIGNIFICANT CHANGE UP
LYMPHOCYTES # BLD AUTO: 0.88 K/UL — LOW (ref 1–3.3)
LYMPHOCYTES # BLD AUTO: 12.3 % — LOW (ref 13–44)
MCHC RBC-ENTMCNC: 27.9 PG — SIGNIFICANT CHANGE UP (ref 27–34)
MCHC RBC-ENTMCNC: 34.3 G/DL — SIGNIFICANT CHANGE UP (ref 32–36)
MCV RBC AUTO: 81.4 FL — SIGNIFICANT CHANGE UP (ref 80–100)
MONOCYTES # BLD AUTO: 0.73 K/UL — SIGNIFICANT CHANGE UP (ref 0–0.9)
MONOCYTES NFR BLD AUTO: 10.2 % — SIGNIFICANT CHANGE UP (ref 2–14)
MONOS+MACROS # FLD: 4 % — SIGNIFICANT CHANGE UP
NEUTROPHILS # BLD AUTO: 5.18 K/UL — SIGNIFICANT CHANGE UP (ref 1.8–7.4)
NEUTROPHILS NFR BLD AUTO: 72.6 % — SIGNIFICANT CHANGE UP (ref 43–77)
NEUTROPHILS-BODY FLUID: 96 % — SIGNIFICANT CHANGE UP
NITRITE UR-MCNC: NEGATIVE — SIGNIFICANT CHANGE UP
NRBC BLD AUTO-RTO: 0 /100 WBCS — SIGNIFICANT CHANGE UP (ref 0–0)
PH UR: 7 — SIGNIFICANT CHANGE UP (ref 5–8)
PLATELET # BLD AUTO: 280 K/UL — SIGNIFICANT CHANGE UP (ref 150–400)
POTASSIUM SERPL-MCNC: 4 MMOL/L — SIGNIFICANT CHANGE UP (ref 3.5–5.3)
POTASSIUM SERPL-SCNC: 4 MMOL/L — SIGNIFICANT CHANGE UP (ref 3.5–5.3)
PROT SERPL-MCNC: 7 GM/DL — SIGNIFICANT CHANGE UP (ref 6–8.3)
PROT UR-MCNC: SIGNIFICANT CHANGE UP MG/DL
RBC # BLD: 3.65 M/UL — LOW (ref 4.2–5.8)
RBC # FLD: 13.2 % — SIGNIFICANT CHANGE UP (ref 10.3–14.5)
RCV VOL RI: HIGH /UL (ref 0–5)
RSV RNA NPH QL NAA+NON-PROBE: SIGNIFICANT CHANGE UP
SARS-COV-2 RNA SPEC QL NAA+PROBE: SIGNIFICANT CHANGE UP
SODIUM SERPL-SCNC: 135 MMOL/L — SIGNIFICANT CHANGE UP (ref 135–145)
SOURCE RESPIRATORY: SIGNIFICANT CHANGE UP
SP GR SPEC: 1.02 — SIGNIFICANT CHANGE UP (ref 1–1.03)
SPECIMEN SOURCE: SIGNIFICANT CHANGE UP
SPECIMEN SOURCE: SIGNIFICANT CHANGE UP
SYNOVIAL CRYSTALS CLARITY: ABNORMAL
SYNOVIAL CRYSTALS COLOR: ABNORMAL
SYNOVIAL CRYSTALS ID: ABNORMAL
SYNOVIAL CRYSTALS TUBE: SIGNIFICANT CHANGE UP
TOTAL NUCLEATED CELL COUNT, BODY FLUID: 6530 /UL — SIGNIFICANT CHANGE UP
TROPONIN I, HIGH SENSITIVITY RESULT: 12.4 NG/L — SIGNIFICANT CHANGE UP
UROBILINOGEN FLD QL: 0.2 MG/DL — SIGNIFICANT CHANGE UP (ref 0.2–1)
WBC # BLD: 7.14 K/UL — SIGNIFICANT CHANGE UP (ref 3.8–10.5)
WBC # FLD AUTO: 7.14 K/UL — SIGNIFICANT CHANGE UP (ref 3.8–10.5)

## 2025-05-09 PROCEDURE — 93010 ELECTROCARDIOGRAM REPORT: CPT

## 2025-05-09 PROCEDURE — 71045 X-RAY EXAM CHEST 1 VIEW: CPT | Mod: 26

## 2025-05-09 PROCEDURE — 73562 X-RAY EXAM OF KNEE 3: CPT | Mod: 26,LT

## 2025-05-09 PROCEDURE — 99285 EMERGENCY DEPT VISIT HI MDM: CPT

## 2025-05-09 PROCEDURE — 93971 EXTREMITY STUDY: CPT | Mod: 26,LT

## 2025-05-09 PROCEDURE — 71275 CT ANGIOGRAPHY CHEST: CPT | Mod: 26

## 2025-05-09 RX ORDER — HYDROMORPHONE/SOD CHLOR,ISO/PF 2 MG/10 ML
2 SYRINGE (ML) INJECTION ONCE
Refills: 0 | Status: DISCONTINUED | OUTPATIENT
Start: 2025-05-09 | End: 2025-05-09

## 2025-05-09 RX ORDER — ACETAMINOPHEN 500 MG/5ML
1000 LIQUID (ML) ORAL ONCE
Refills: 0 | Status: COMPLETED | OUTPATIENT
Start: 2025-05-09 | End: 2025-05-09

## 2025-05-09 RX ORDER — HYDROMORPHONE/SOD CHLOR,ISO/PF 2 MG/10 ML
2 SYRINGE (ML) INJECTION EVERY 6 HOURS
Refills: 0 | Status: DISCONTINUED | OUTPATIENT
Start: 2025-05-09 | End: 2025-05-09

## 2025-05-09 RX ORDER — HYDROMORPHONE/SOD CHLOR,ISO/PF 2 MG/10 ML
1 SYRINGE (ML) INJECTION ONCE
Refills: 0 | Status: DISCONTINUED | OUTPATIENT
Start: 2025-05-09 | End: 2025-05-09

## 2025-05-09 RX ORDER — CEFAZOLIN SODIUM IN 0.9 % NACL 3 G/100 ML
1000 INTRAVENOUS SOLUTION, PIGGYBACK (ML) INTRAVENOUS ONCE
Refills: 0 | Status: DISCONTINUED | OUTPATIENT
Start: 2025-05-09 | End: 2025-05-09

## 2025-05-09 RX ORDER — HYDROMORPHONE/SOD CHLOR,ISO/PF 2 MG/10 ML
1 SYRINGE (ML) INJECTION
Qty: 24 | Refills: 0
Start: 2025-05-09 | End: 2025-05-12

## 2025-05-09 RX ORDER — HYDROMORPHONE/SOD CHLOR,ISO/PF 2 MG/10 ML
1 SYRINGE (ML) INJECTION
Qty: 18 | Refills: 0
Start: 2025-05-09 | End: 2025-05-11

## 2025-05-09 RX ADMIN — Medication 2 MILLIGRAM(S): at 07:23

## 2025-05-09 RX ADMIN — Medication 400 MILLIGRAM(S): at 11:48

## 2025-05-09 RX ADMIN — Medication 1 MILLIGRAM(S): at 08:53

## 2025-05-09 RX ADMIN — Medication 400 MILLIGRAM(S): at 01:04

## 2025-05-09 RX ADMIN — Medication 1 MILLIGRAM(S): at 00:45

## 2025-05-09 RX ADMIN — Medication 1000 MILLILITER(S): at 01:48

## 2025-05-09 RX ADMIN — Medication 2 MILLIGRAM(S): at 14:04

## 2025-05-09 RX ADMIN — Medication 2 MILLIGRAM(S): at 15:04

## 2025-05-09 RX ADMIN — Medication 2 MILLIGRAM(S): at 05:02

## 2025-05-09 RX ADMIN — Medication 1000 MILLIGRAM(S): at 12:03

## 2025-05-09 RX ADMIN — Medication 1 MILLIGRAM(S): at 09:53

## 2025-05-09 RX ADMIN — Medication 2 MILLIGRAM(S): at 01:42

## 2025-05-09 RX ADMIN — Medication 1000 MILLIGRAM(S): at 13:03

## 2025-05-09 NOTE — ED PROVIDER NOTE - PHYSICAL EXAMINATION
Vitals: HTN at 145/85, otherwise WNL  Gen: AAOx3, NAD, sitting comfortably in stretcher  Head: ncat, perrla, eomi b/l  Neck: supple, no lymphadenopathy, no midline deviation  Heart: rrr, no m/r/g  Lungs: CTA b/l, no rales/ronchi/wheezes  Abd: soft, nontender, non-distended, no rebound or guarding  Ext: no clubbing/cyanosis/edema  Neuro: sensation and muscle strength intact b/l, steady gait

## 2025-05-09 NOTE — ED PROVIDER NOTE - NSFOLLOWUPINSTRUCTIONS_ED_ALL_ED_FT
You were seen today for severe left knee pain, there is no signs of infection per orthopedic team.  They recommend follow-up early next week in the office.  Return for any new or worsening symptoms such as recurrent fever, worsening pain, new chest pain or shortness of breath.  He had a CT scan of the chest and ultrasound of the left leg which did not show any blood clot.  You were incidentally found to have some lung nodules and you do need to follow-up with your primary care doctor for monitoring of the lung nodules for routine cancer screening.    Your medication was changed from oxycodone to hydromorphone.  This medication is stronger than oxycodone even though it may seem like a lower dose.  You can take hydromorphone 2 mg every 4 hours as needed for severe pain.  Use this medication sparingly as a medication can cause sedation, and an overdose can cause you to stop breathing.  Always carry Narcan kit with you and advise family members on how to use it in case any sedation occurs.  And ice the leg and elevate to help improve pain.  Hydromorphone may also cause dizziness constipation and addiction.  Take responsibly.  We only gave you enough medication until follow-up with your orthopedist.

## 2025-05-09 NOTE — ED ADULT NURSE NOTE - NSFALLRISKINTERV_ED_ALL_ED
Assistance OOB with selected safe patient handling equipment if applicable/Assistance with ambulation/Communicate fall risk and risk factors to all staff, patient, and family/Monitor gait and stability/Provide visual cue: yellow wristband, yellow gown, etc/Reinforce activity limits and safety measures with patient and family/Call bell, personal items and telephone in reach/Instruct patient to call for assistance before getting out of bed/chair/stretcher/Non-slip footwear applied when patient is off stretcher/[place] to call system/Physically safe environment - no spills, clutter or unnecessary equipment/Purposeful Proactive Rounding/Room/bathroom lighting operational, light cord in reach

## 2025-05-09 NOTE — ED ADULT TRIAGE NOTE - CHIEF COMPLAINT QUOTE
Patient reports: "I had a knee replacement on Thursday. Left Leg is swollen and painful from knee to toes." Left leg warmer than right distal pulses palpable. Rates pain 10/10 appears uncomfortable. Shaking. Heartrate 185 confirmed by palpation.  PMH: OHS: Aneurysm repair, AVR, Patient reports: "I had a knee replacement on Thursday. Left Leg is swollen and painful from knee to toes." Left leg warmer than right distal pulses palpable. Rates pain 10/10 appears uncomfortable. Shaking. Heartrate 185 confirmed by palpation.  PMH: OHS: Aortic Aneurysm repair with 3 inches replaced, AVR, CABG x1V; HTN, HLD Sleep apnea, Hypothyroidism, Neuropathy, Lumbar fusion.

## 2025-05-09 NOTE — ED PROVIDER NOTE - PATIENT PORTAL LINK FT
You can access the FollowMyHealth Patient Portal offered by [facility] by registering at the following website: [email or web address]. By joining [employer]’s FollowMyHealth portal, you will also be able to view your health information using other applications (apps) compatible with our system.

## 2025-05-09 NOTE — CONSULT NOTE ADULT - SUBJECTIVE AND OBJECTIVE BOX
75y Male presents with L Knee pain sp L TKA with Dr. Clemente on 5/1/25. States that pain has progressively worsened since Sunday and has endorsed difficulty and worsening pain with ambulation since that no longer improves with pain medication. Denies numbness/tingling in the affected extremity. Denies head strike/LOC/other orthopedic injuries at this time. Patient ambulates with assistance since surgery. Subjective Fevers started earlier today--Tmax 101.3 in ED. On A81.     PAST MEDICAL & SURGICAL HISTORY:  Hypothyroidism      Hyperlipidemia      HTN (hypertension)      Neuropathy of leg  right and left thigh      Hay fever      Sleep apnea  diagnosed 14 years ago      Abdominal aortic aneurysm      Aortic valve disorder      S/P CABG (coronary artery bypass graft)  CABG x1 with repair of abdominal aortic aneurysm, and aortic valve replacement  on 10/4/13      S/P tonsillectomy  as a child      S/P arthroscopy of left knee  August2013      S/P uvulopalatopharyngoplasty  14 years ago to correct sleep apnea      Hallux rigidus, left  great toe correction in 2011      S/P lumbar fusion      S/P aortic valve replacement        Home Medications:  acetaminophen 500 mg oral tablet: 2 tab(s) orally every 8 hours (02 May 2025 10:07)  cholecalciferol 10 mcg/0.25 mL (400 intl units/0.25 mL) oral liquid: 0.25 milliliter(s) orally once a day (01 May 2025 10:54)  levothyroxine 150 mcg (0.15 mg) oral tablet: 1 tab(s) orally once a day (01 May 2025 10:54)  losartan 50 mg oral tablet: 1 tab(s) orally once a day (01 May 2025 10:54)  metoprolol succinate 100 mg oral capsule, extended release: 1 cap(s) orally once a day (at bedtime) (01 May 2025 10:54)  Myrbetriq 50 mg oral tablet, extended release: 1 tab(s) orally once a day (01 May 2025 10:54)  omeprazole 20 mg oral delayed release tablet: 1 tab(s) orally once a day (at bedtime) (01 May 2025 10:54)  polyethylene glycol 3350 oral powder for reconstitution: 17 gram(s) orally once a day (at bedtime) (02 May 2025 10:07)  rosuvastatin 20 mg oral capsule: 1 cap(s) orally once a day (at bedtime) (01 May 2025 10:54)  senna leaf extract oral tablet: 2 tab(s) orally once a day (at bedtime) (02 May 2025 10:07)  tirzepatide: 1 application injectable once a week (02 May 2025 10:07)  tiZANidine 4 mg oral tablet: 2 tab(s) orally once a day (at bedtime) (01 May 2025 10:54)    Allergies    Seafood (Vomiting)  No Known Drug Allergies    Intolerances                              10.2   7.14  )-----------( 280      ( 09 May 2025 00:42 )             29.7     05-09    135  |  102  |  28[H]  ----------------------------<  96  4.0   |  23  |  1.62[H]    Ca    8.7      09 May 2025 00:42    TPro  7.0  /  Alb  3.2[L]  /  TBili  0.8  /  DBili  x   /  AST  51[H]  /  ALT  58  /  AlkPhos  75  05-09      Urinalysis Basic - ( 09 May 2025 00:42 )    Color: x / Appearance: x / SG: x / pH: x  Gluc: 96 mg/dL / Ketone: x  / Bili: x / Urobili: x   Blood: x / Protein: x / Nitrite: x   Leuk Esterase: x / RBC: x / WBC x   Sq Epi: x / Non Sq Epi: x / Bacteria: x          Vital Signs Last 24 Hrs  T(C): 38.5 (09 May 2025 00:27), Max: 38.5 (09 May 2025 00:27)  T(F): 101.3 (09 May 2025 00:27), Max: 101.3 (09 May 2025 00:27)  HR: 82 (09 May 2025 00:27) (82 - 185)  BP: 145/85 (09 May 2025 00:27) (90/58 - 145/85)  BP(mean): --  RR: 26 (09 May 2025 00:27) (26 - 26)  SpO2: 95% (09 May 2025 00:27) (93% - 95%)    Parameters below as of 09 May 2025 00:08  Patient On (Oxygen Delivery Method): room air        PHYSICAL EXAM  General: NAD, Awake and Alert  LLE  incision cdi  global knee swelling with surrounding blanching  no drainage from incision   Motor: +EHL/FHL/GSC/TA  Sensory: +SILT SPN/DPN/MONY/SAPH  Active ROM: minimal  Passive ROM: 0-70  minimal SLR      IMAGING:  XR L Knee: Pending  US LLE: Pending       Assessment/Plan:  75y Male POD 8 sp L TKA presents with L knee effusion and worsening pain with ambulation. Tmax of 101.3 in ED. No work up obtained at this time.     FU XR L Knee  FU US LLE   FU D-Dimer  FU Labs  WBAT  Pain control prn  No Antibiotics at this time    ***Note Incomplete. Further recommendations to be provided once above completed. ***   75y Male presents with L Knee pain sp L TKA with Dr. Clemente on 5/1/25. States that pain has progressively worsened since Sunday and has endorsed difficulty and worsening pain with ambulation since that no longer improves with pain medication. Denies numbness/tingling in the affected extremity. Denies head strike/LOC/other orthopedic injuries at this time. Patient ambulates with assistance since surgery. Subjective Fevers started earlier today--Tmax 101.3 in ED. On A81.     PAST MEDICAL & SURGICAL HISTORY:  Hypothyroidism      Hyperlipidemia      HTN (hypertension)      Neuropathy of leg  right and left thigh      Hay fever      Sleep apnea  diagnosed 14 years ago      Abdominal aortic aneurysm      Aortic valve disorder      S/P CABG (coronary artery bypass graft)  CABG x1 with repair of abdominal aortic aneurysm, and aortic valve replacement  on 10/4/13      S/P tonsillectomy  as a child      S/P arthroscopy of left knee  August2013      S/P uvulopalatopharyngoplasty  14 years ago to correct sleep apnea      Hallux rigidus, left  great toe correction in 2011      S/P lumbar fusion      S/P aortic valve replacement        Home Medications:  acetaminophen 500 mg oral tablet: 2 tab(s) orally every 8 hours (02 May 2025 10:07)  cholecalciferol 10 mcg/0.25 mL (400 intl units/0.25 mL) oral liquid: 0.25 milliliter(s) orally once a day (01 May 2025 10:54)  levothyroxine 150 mcg (0.15 mg) oral tablet: 1 tab(s) orally once a day (01 May 2025 10:54)  losartan 50 mg oral tablet: 1 tab(s) orally once a day (01 May 2025 10:54)  metoprolol succinate 100 mg oral capsule, extended release: 1 cap(s) orally once a day (at bedtime) (01 May 2025 10:54)  Myrbetriq 50 mg oral tablet, extended release: 1 tab(s) orally once a day (01 May 2025 10:54)  omeprazole 20 mg oral delayed release tablet: 1 tab(s) orally once a day (at bedtime) (01 May 2025 10:54)  polyethylene glycol 3350 oral powder for reconstitution: 17 gram(s) orally once a day (at bedtime) (02 May 2025 10:07)  rosuvastatin 20 mg oral capsule: 1 cap(s) orally once a day (at bedtime) (01 May 2025 10:54)  senna leaf extract oral tablet: 2 tab(s) orally once a day (at bedtime) (02 May 2025 10:07)  tirzepatide: 1 application injectable once a week (02 May 2025 10:07)  tiZANidine 4 mg oral tablet: 2 tab(s) orally once a day (at bedtime) (01 May 2025 10:54)    Allergies    Seafood (Vomiting)  No Known Drug Allergies    Intolerances                              10.2   7.14  )-----------( 280      ( 09 May 2025 00:42 )             29.7     05-09    135  |  102  |  28[H]  ----------------------------<  96  4.0   |  23  |  1.62[H]    Ca    8.7      09 May 2025 00:42    TPro  7.0  /  Alb  3.2[L]  /  TBili  0.8  /  DBili  x   /  AST  51[H]  /  ALT  58  /  AlkPhos  75  05-09      Urinalysis Basic - ( 09 May 2025 00:42 )    Color: x / Appearance: x / SG: x / pH: x  Gluc: 96 mg/dL / Ketone: x  / Bili: x / Urobili: x   Blood: x / Protein: x / Nitrite: x   Leuk Esterase: x / RBC: x / WBC x   Sq Epi: x / Non Sq Epi: x / Bacteria: x          Vital Signs Last 24 Hrs  T(C): 38.5 (09 May 2025 00:27), Max: 38.5 (09 May 2025 00:27)  T(F): 101.3 (09 May 2025 00:27), Max: 101.3 (09 May 2025 00:27)  HR: 82 (09 May 2025 00:27) (82 - 185)  BP: 145/85 (09 May 2025 00:27) (90/58 - 145/85)  BP(mean): --  RR: 26 (09 May 2025 00:27) (26 - 26)  SpO2: 95% (09 May 2025 00:27) (93% - 95%)    Parameters below as of 09 May 2025 00:08  Patient On (Oxygen Delivery Method): room air        PHYSICAL EXAM  General: NAD, Awake and Alert  LLE  incision cdi  global knee swelling with surrounding blanching  no drainage from incision   Motor: +EHL/FHL/GSC/TA  Sensory: +SILT SPN/DPN/MONY/SAPH  Active ROM: minimal  Passive ROM: 0-70  minimal SLR      IMAGING:  XR L Knee: Pending  US LLE: Pending       Assessment/Plan:  75y Male POD 8 sp L TKA presents with L knee effusion and worsening pain with ambulation. Tmax of 101.3 in ED.     L Knee Aspiration: 40cc Sanguinous: CC: 6K; FU Cx/GS/PCR/Crystals  HOLD Aspirin for 2 days. Do not resume until 5/11  WBAT  FU with Dr. Clemente Next week.     Discussed with Dr. Clemente who is aware and agrees with plan.     No acute orthopaedic surgical intervention indicated at this time. This patient is orthopaedically stable for discharge.   Patient to follow up with Dr. Clemente as an outpatient next week for further evaluation and management.   All of the patient's questions and concerns were answered and addressed.     75y Male presents with L Knee pain sp L TKA with Dr. Clemente on 5/1/25. States that pain has progressively worsened since Sunday and has endorsed difficulty and worsening pain with ambulation since that no longer improves with pain medication. Denies numbness/tingling in the affected extremity. Denies head strike/LOC/other orthopedic injuries at this time. Patient ambulates with assistance since surgery. Subjective Fevers started earlier today--Tmax 101.3 in ED. On A81.     PAST MEDICAL & SURGICAL HISTORY:  Hypothyroidism      Hyperlipidemia      HTN (hypertension)      Neuropathy of leg  right and left thigh      Hay fever      Sleep apnea  diagnosed 14 years ago      Abdominal aortic aneurysm      Aortic valve disorder      S/P CABG (coronary artery bypass graft)  CABG x1 with repair of abdominal aortic aneurysm, and aortic valve replacement  on 10/4/13      S/P tonsillectomy  as a child      S/P arthroscopy of left knee  August2013      S/P uvulopalatopharyngoplasty  14 years ago to correct sleep apnea      Hallux rigidus, left  great toe correction in 2011      S/P lumbar fusion      S/P aortic valve replacement        Home Medications:  acetaminophen 500 mg oral tablet: 2 tab(s) orally every 8 hours (02 May 2025 10:07)  cholecalciferol 10 mcg/0.25 mL (400 intl units/0.25 mL) oral liquid: 0.25 milliliter(s) orally once a day (01 May 2025 10:54)  levothyroxine 150 mcg (0.15 mg) oral tablet: 1 tab(s) orally once a day (01 May 2025 10:54)  losartan 50 mg oral tablet: 1 tab(s) orally once a day (01 May 2025 10:54)  metoprolol succinate 100 mg oral capsule, extended release: 1 cap(s) orally once a day (at bedtime) (01 May 2025 10:54)  Myrbetriq 50 mg oral tablet, extended release: 1 tab(s) orally once a day (01 May 2025 10:54)  omeprazole 20 mg oral delayed release tablet: 1 tab(s) orally once a day (at bedtime) (01 May 2025 10:54)  polyethylene glycol 3350 oral powder for reconstitution: 17 gram(s) orally once a day (at bedtime) (02 May 2025 10:07)  rosuvastatin 20 mg oral capsule: 1 cap(s) orally once a day (at bedtime) (01 May 2025 10:54)  senna leaf extract oral tablet: 2 tab(s) orally once a day (at bedtime) (02 May 2025 10:07)  tirzepatide: 1 application injectable once a week (02 May 2025 10:07)  tiZANidine 4 mg oral tablet: 2 tab(s) orally once a day (at bedtime) (01 May 2025 10:54)    Allergies    Seafood (Vomiting)  No Known Drug Allergies    Intolerances                              10.2   7.14  )-----------( 280      ( 09 May 2025 00:42 )             29.7     05-09    135  |  102  |  28[H]  ----------------------------<  96  4.0   |  23  |  1.62[H]    Ca    8.7      09 May 2025 00:42    TPro  7.0  /  Alb  3.2[L]  /  TBili  0.8  /  DBili  x   /  AST  51[H]  /  ALT  58  /  AlkPhos  75  05-09      Urinalysis Basic - ( 09 May 2025 00:42 )    Color: x / Appearance: x / SG: x / pH: x  Gluc: 96 mg/dL / Ketone: x  / Bili: x / Urobili: x   Blood: x / Protein: x / Nitrite: x   Leuk Esterase: x / RBC: x / WBC x   Sq Epi: x / Non Sq Epi: x / Bacteria: x          Vital Signs Last 24 Hrs  T(C): 38.5 (09 May 2025 00:27), Max: 38.5 (09 May 2025 00:27)  T(F): 101.3 (09 May 2025 00:27), Max: 101.3 (09 May 2025 00:27)  HR: 82 (09 May 2025 00:27) (82 - 185)  BP: 145/85 (09 May 2025 00:27) (90/58 - 145/85)  BP(mean): --  RR: 26 (09 May 2025 00:27) (26 - 26)  SpO2: 95% (09 May 2025 00:27) (93% - 95%)    Parameters below as of 09 May 2025 00:08  Patient On (Oxygen Delivery Method): room air        PHYSICAL EXAM  General: NAD, Awake and Alert  LLE  incision cdi  global knee swelling with surrounding blanching  no drainage from incision   Motor: +EHL/FHL/GSC/TA  Sensory: +SILT SPN/DPN/MONY/SAPH  Active ROM: minimal  Passive ROM: 0-70  minimal SLR      IMAGING:  XR L Knee: Pending  US LLE: Pending       Assessment/Plan:  75y Male POD 8 sp L TKA presents with L knee effusion and worsening pain with ambulation. Tmax of 101.3 in ED.     L Knee Aspiration: 40cc Sanguinous: CC: 6K; FU Cx/GS/PCR/Crystals  HOLD Aspirin for 2 days. Do not resume until 5/11  Spoke with patient and patients wife, patient requesting to go home, states he can manage with dilaudid.   Recommend DC on dilaudid for pain control at home  WBAT  FU with Dr. Clemente Next week.     Discussed with Dr. Clemente who is aware and agrees with plan.     No acute orthopaedic surgical intervention indicated at this time. This patient is orthopaedically stable for discharge.   Patient to follow up with Dr. Clemente as an outpatient next week for further evaluation and management.   All of the patient's questions and concerns were answered and addressed.

## 2025-05-09 NOTE — ED ADULT NURSE NOTE - CHIEF COMPLAINT QUOTE
Patient reports: "I had a knee replacement on Thursday. Left Leg is swollen and painful from knee to toes." Left leg warmer than right distal pulses palpable. Rates pain 10/10 appears uncomfortable. Shaking. Heartrate 185 confirmed by palpation.  PMH: OHS: Aortic Aneurysm repair with 3 inches replaced, AVR, CABG x1V; HTN, HLD Sleep apnea, Hypothyroidism, Neuropathy, Lumbar fusion.

## 2025-05-09 NOTE — ED PROVIDER NOTE - OBJECTIVE STATEMENT
76 yo M with L knee pain for days, since L knee surgery.  Worsening over last couple of days, becoming less responsive to oxycodone 10 mg pills, prescribed by ortho  ROS: negative for fever, cough, headache, chest pain, shortness of breath, abd pain, nausea, vomiting, diarrhea, rash, paresthesia, and weakness--all other systems reviewed are negative.   PMH: HTN, CAD s/p 1 stent, aortic valve replacement, osteoarthritis left knee, s/p left TKA by Dr. Black Clemente on 5/1/25; Meds: See EMR for list ;SH: Denies smoking/drinking/drug use

## 2025-05-13 ENCOUNTER — LABORATORY RESULT (OUTPATIENT)
Age: 75
End: 2025-05-13

## 2025-05-13 ENCOUNTER — APPOINTMENT (OUTPATIENT)
Dept: ORTHOPEDIC SURGERY | Facility: CLINIC | Age: 75
End: 2025-05-13
Payer: MEDICARE

## 2025-05-13 ENCOUNTER — RESULT CHARGE (OUTPATIENT)
Age: 75
End: 2025-05-13

## 2025-05-13 PROBLEM — Z96.652 STATUS POST TOTAL LEFT KNEE REPLACEMENT: Status: ACTIVE | Noted: 2025-05-13

## 2025-05-13 PROCEDURE — 73562 X-RAY EXAM OF KNEE 3: CPT | Mod: LT

## 2025-05-13 PROCEDURE — 99024 POSTOP FOLLOW-UP VISIT: CPT

## 2025-05-13 PROCEDURE — 20610 DRAIN/INJ JOINT/BURSA W/O US: CPT | Mod: LT

## 2025-05-13 RX ORDER — CELECOXIB 200 MG/1
200 CAPSULE ORAL TWICE DAILY
Qty: 60 | Refills: 0 | Status: ACTIVE | COMMUNITY
Start: 2025-05-13 | End: 1900-01-01

## 2025-05-14 DIAGNOSIS — M17.12 UNILATERAL PRIMARY OSTEOARTHRITIS, LEFT KNEE: ICD-10-CM

## 2025-05-14 DIAGNOSIS — Z79.82 LONG TERM (CURRENT) USE OF ASPIRIN: ICD-10-CM

## 2025-05-14 DIAGNOSIS — E78.5 HYPERLIPIDEMIA, UNSPECIFIED: ICD-10-CM

## 2025-05-14 DIAGNOSIS — N18.2 CHRONIC KIDNEY DISEASE, STAGE 2 (MILD): ICD-10-CM

## 2025-05-14 DIAGNOSIS — Z90.89 ACQUIRED ABSENCE OF OTHER ORGANS: ICD-10-CM

## 2025-05-14 DIAGNOSIS — I25.10 ATHEROSCLEROTIC HEART DISEASE OF NATIVE CORONARY ARTERY WITHOUT ANGINA PECTORIS: ICD-10-CM

## 2025-05-14 DIAGNOSIS — Z95.1 PRESENCE OF AORTOCORONARY BYPASS GRAFT: ICD-10-CM

## 2025-05-14 DIAGNOSIS — E03.9 HYPOTHYROIDISM, UNSPECIFIED: ICD-10-CM

## 2025-05-14 DIAGNOSIS — Z79.890 HORMONE REPLACEMENT THERAPY: ICD-10-CM

## 2025-05-14 DIAGNOSIS — I73.9 PERIPHERAL VASCULAR DISEASE, UNSPECIFIED: ICD-10-CM

## 2025-05-14 DIAGNOSIS — Z95.2 PRESENCE OF PROSTHETIC HEART VALVE: ICD-10-CM

## 2025-05-14 DIAGNOSIS — I12.9 HYPERTENSIVE CHRONIC KIDNEY DISEASE WITH STAGE 1 THROUGH STAGE 4 CHRONIC KIDNEY DISEASE, OR UNSPECIFIED CHRONIC KIDNEY DISEASE: ICD-10-CM

## 2025-05-14 DIAGNOSIS — Z98.1 ARTHRODESIS STATUS: ICD-10-CM

## 2025-05-14 LAB
CULTURE RESULTS: SIGNIFICANT CHANGE UP
CULTURE RESULTS: SIGNIFICANT CHANGE UP
SPECIMEN SOURCE: SIGNIFICANT CHANGE UP
SPECIMEN SOURCE: SIGNIFICANT CHANGE UP

## 2025-05-16 LAB
ALPHA DEFENSIN, SYNOVIAL FL: POSITIVE
B PERT IGG+IGM PNL SER: ABNORMAL
COLOR FLD: NORMAL
FLUID INTAKE SUBSTANCE CLASS: NORMAL
JOINT PCR PANEL: NOT DETECTED
JOINT PCR SOURCE: NORMAL
LYMPHOCYTES # FLD MANUAL: 3 %
MONOS+MACROS NFR FLD MANUAL: 2 %
NEUTS SEG # FLD MANUAL: 95 %
PRELIM TEST AFFECT FURTHER TEST SPEC: YES
RBC # FLD MANUAL: NORMAL CELLS/UL
SPECIMEN SOURCE SNV: NORMAL
TOTAL CELLS COUNTED FLD: 4450 CELLS/UL
TUBE TYPE: NORMAL
WBC COUNT: 4449 CELLS/UL

## 2025-05-20 ENCOUNTER — LABORATORY RESULT (OUTPATIENT)
Age: 75
End: 2025-05-20

## 2025-05-20 ENCOUNTER — APPOINTMENT (OUTPATIENT)
Dept: ORTHOPEDIC SURGERY | Facility: CLINIC | Age: 75
End: 2025-05-20
Payer: MEDICARE

## 2025-05-20 VITALS — BODY MASS INDEX: 30.49 KG/M2 | WEIGHT: 213 LBS | HEIGHT: 70 IN

## 2025-05-20 PROCEDURE — 99024 POSTOP FOLLOW-UP VISIT: CPT

## 2025-05-20 RX ORDER — HYDROMORPHONE HYDROCHLORIDE 2 MG/1
2 TABLET ORAL
Qty: 60 | Refills: 0 | Status: ACTIVE | COMMUNITY
Start: 2025-05-13 | End: 1900-01-01

## 2025-05-22 LAB
JOINT PCR PANEL: NOT DETECTED
JOINT PCR SOURCE: NORMAL

## 2025-05-23 ENCOUNTER — APPOINTMENT (OUTPATIENT)
Dept: ORTHOPEDIC SURGERY | Facility: CLINIC | Age: 75
End: 2025-05-23

## 2025-05-23 ENCOUNTER — APPOINTMENT (OUTPATIENT)
Dept: ORTHOPEDIC SURGERY | Facility: CLINIC | Age: 75
End: 2025-05-23
Payer: MEDICARE

## 2025-05-23 VITALS — HEIGHT: 70 IN | WEIGHT: 213 LBS | BODY MASS INDEX: 30.49 KG/M2

## 2025-05-23 DIAGNOSIS — Z96.652 PRESENCE OF LEFT ARTIFICIAL KNEE JOINT: ICD-10-CM

## 2025-05-23 LAB
ALPHA DEFENSIN, SYNOVIAL FL: NEGATIVE
JOINT CULTURE: NORMAL
PRELIM TEST AFFECT FURTHER TEST SPEC: YES
SPECIMEN SOURCE SNV: NORMAL

## 2025-05-23 PROCEDURE — 99024 POSTOP FOLLOW-UP VISIT: CPT

## 2025-05-23 RX ORDER — METHYLPREDNISOLONE 4 MG/1
4 TABLET ORAL
Qty: 1 | Refills: 0 | Status: ACTIVE | COMMUNITY
Start: 2025-05-23 | End: 1900-01-01

## 2025-05-29 LAB — JOINT CULTURE: NORMAL

## 2025-06-03 ENCOUNTER — APPOINTMENT (OUTPATIENT)
Dept: ORTHOPEDIC SURGERY | Facility: CLINIC | Age: 75
End: 2025-06-03
Payer: MEDICARE

## 2025-06-03 ENCOUNTER — RESULT CHARGE (OUTPATIENT)
Age: 75
End: 2025-06-03

## 2025-06-03 VITALS — WEIGHT: 213 LBS | HEIGHT: 70 IN | BODY MASS INDEX: 30.49 KG/M2

## 2025-06-03 DIAGNOSIS — Z96.652 PRESENCE OF LEFT ARTIFICIAL KNEE JOINT: ICD-10-CM

## 2025-06-03 DIAGNOSIS — M17.12 UNILATERAL PRIMARY OSTEOARTHRITIS, LEFT KNEE: ICD-10-CM

## 2025-06-03 PROCEDURE — 99024 POSTOP FOLLOW-UP VISIT: CPT

## 2025-06-03 PROCEDURE — 73562 X-RAY EXAM OF KNEE 3: CPT | Mod: LT

## 2025-06-05 ENCOUNTER — NON-APPOINTMENT (OUTPATIENT)
Age: 75
End: 2025-06-05

## 2025-06-12 ENCOUNTER — NON-APPOINTMENT (OUTPATIENT)
Age: 75
End: 2025-06-12

## 2025-06-17 ENCOUNTER — APPOINTMENT (OUTPATIENT)
Dept: ORTHOPEDIC SURGERY | Facility: CLINIC | Age: 75
End: 2025-06-17
Payer: MEDICARE

## 2025-06-17 PROCEDURE — 20610 DRAIN/INJ JOINT/BURSA W/O US: CPT | Mod: LT

## 2025-06-17 PROCEDURE — 73562 X-RAY EXAM OF KNEE 3: CPT | Mod: LT

## 2025-06-17 PROCEDURE — 99024 POSTOP FOLLOW-UP VISIT: CPT

## 2025-06-19 LAB
B PERT IGG+IGM PNL SER: ABNORMAL
COLOR FLD: NORMAL
EOSINOPHIL # FLD MANUAL: 0 %
FLUID INTAKE SUBSTANCE CLASS: NORMAL
JOINT PCR PANEL: NOT DETECTED
JOINT PCR SOURCE: NORMAL
LYMPHOCYTES # FLD MANUAL: 14 %
MESOTHL CELL NFR FLD: 0 %
MONOS+MACROS NFR FLD MANUAL: 8 %
NEUTS SEG # FLD MANUAL: 78 %
NRBC # FLD: 0 %
RBC # FLD MANUAL: NORMAL CELLS/UL
SYCRY CLARITY: ABNORMAL
SYCRY COLOR: ABNORMAL
SYCRY ID: NORMAL
SYCRY TUBE: NORMAL
TOTAL CELLS COUNTED FLD: 131 CELLS/UL
TUBE TYPE: NORMAL
UNIDENT CELLS NFR FLD MANUAL: 0 %
VARIANT LYMPHS # FLD MANUAL: 0 %
WBC COUNT: 131 CELLS/UL

## 2025-06-24 LAB
ALPHA DEFENSIN, SYNOVIAL FL: NEGATIVE
JOINT CULTURE: ABNORMAL
PRELIM TEST AFFECT FURTHER TEST SPEC: YES
SPECIMEN SOURCE SNV: NORMAL

## 2025-07-01 ENCOUNTER — APPOINTMENT (OUTPATIENT)
Dept: ORTHOPEDIC SURGERY | Facility: CLINIC | Age: 75
End: 2025-07-01
Payer: MEDICARE

## 2025-07-01 PROCEDURE — 99024 POSTOP FOLLOW-UP VISIT: CPT

## 2025-07-15 ENCOUNTER — NON-APPOINTMENT (OUTPATIENT)
Age: 75
End: 2025-07-15

## 2025-07-18 ENCOUNTER — APPOINTMENT (OUTPATIENT)
Dept: ORTHOPEDIC SURGERY | Facility: CLINIC | Age: 75
End: 2025-07-18
Payer: MEDICARE

## 2025-07-18 PROCEDURE — 20610 DRAIN/INJ JOINT/BURSA W/O US: CPT | Mod: LT

## 2025-07-18 PROCEDURE — 99024 POSTOP FOLLOW-UP VISIT: CPT

## 2025-07-20 NOTE — H&P PST ADULT - RESPIRATORY
Medications as prescribed.  Can alternate over-the-counter Motrin or Tylenol as needed for pain.  Keep ears dry.  Follow-up with family doctor in 3 to 5 days.  Go to the emergency room for any fever, new or worsening symptoms.  
normal/clear to auscultation bilaterally/no wheezes/no rales/no rhonchi

## 2025-07-22 LAB
ALPHA DEFENSIN, SYNOVIAL FL: NEGATIVE
B PERT IGG+IGM PNL SER: ABNORMAL
COLOR FLD: NORMAL
EOSINOPHIL # FLD MANUAL: 1 %
FLUID INTAKE SUBSTANCE CLASS: NORMAL
JOINT PCR PANEL: NOT DETECTED
JOINT PCR SOURCE: NORMAL
LYMPHOCYTES # FLD MANUAL: 9 %
MESOTHL CELL NFR FLD: 0 %
MONOS+MACROS NFR FLD MANUAL: 29 %
NEUTS SEG # FLD MANUAL: 61 %
NRBC # FLD: 0 %
PRELIM TEST AFFECT FURTHER TEST SPEC: YES
RBC # FLD MANUAL: NORMAL CELLS/UL
SPECIMEN SOURCE SNV: NORMAL
SYCRY CLARITY: ABNORMAL
SYCRY COLOR: ABNORMAL
SYCRY ID: ABNORMAL
SYCRY TUBE: NORMAL
TOTAL CELLS COUNTED FLD: 297 CELLS/UL
TUBE TYPE: NORMAL
UNIDENT CELLS NFR FLD MANUAL: 0 %
VARIANT LYMPHS # FLD MANUAL: 0 %
WBC COUNT: 290 CELLS/UL

## 2025-07-24 LAB — JOINT CULTURE: NORMAL

## 2025-07-30 ENCOUNTER — NON-APPOINTMENT (OUTPATIENT)
Age: 75
End: 2025-07-30

## 2025-08-22 ENCOUNTER — RESULT CHARGE (OUTPATIENT)
Age: 75
End: 2025-08-22

## 2025-08-22 ENCOUNTER — APPOINTMENT (OUTPATIENT)
Dept: ORTHOPEDIC SURGERY | Facility: CLINIC | Age: 75
End: 2025-08-22

## 2025-08-22 DIAGNOSIS — Z96.652 PRESENCE OF LEFT ARTIFICIAL KNEE JOINT: ICD-10-CM

## 2025-08-22 PROCEDURE — 73562 X-RAY EXAM OF KNEE 3: CPT | Mod: LT

## 2025-08-22 PROCEDURE — 99213 OFFICE O/P EST LOW 20 MIN: CPT

## 2025-09-04 ENCOUNTER — APPOINTMENT (OUTPATIENT)
Dept: CT IMAGING | Facility: CLINIC | Age: 75
End: 2025-09-04
Payer: MEDICARE

## 2025-09-04 PROCEDURE — 73700 CT LOWER EXTREMITY W/O DYE: CPT | Mod: LT

## 2025-09-05 ENCOUNTER — APPOINTMENT (OUTPATIENT)
Dept: ORTHOPEDIC SURGERY | Facility: CLINIC | Age: 75
End: 2025-09-05

## 2025-09-05 ENCOUNTER — APPOINTMENT (OUTPATIENT)
Dept: ORTHOPEDIC SURGERY | Facility: CLINIC | Age: 75
End: 2025-09-05
Payer: MEDICARE

## 2025-09-05 VITALS — BODY MASS INDEX: 27.32 KG/M2 | HEIGHT: 70.5 IN | WEIGHT: 193 LBS

## 2025-09-05 DIAGNOSIS — M17.11 UNILATERAL PRIMARY OSTEOARTHRITIS, RIGHT KNEE: ICD-10-CM

## 2025-09-05 PROCEDURE — 99214 OFFICE O/P EST MOD 30 MIN: CPT

## 2025-09-05 PROCEDURE — 73562 X-RAY EXAM OF KNEE 3: CPT | Mod: RT

## 2025-09-09 ENCOUNTER — APPOINTMENT (OUTPATIENT)
Dept: ORTHOPEDIC SURGERY | Facility: CLINIC | Age: 75
End: 2025-09-09
Payer: MEDICARE

## 2025-09-09 DIAGNOSIS — Z96.652 PRESENCE OF LEFT ARTIFICIAL KNEE JOINT: ICD-10-CM

## 2025-09-09 DIAGNOSIS — M17.12 UNILATERAL PRIMARY OSTEOARTHRITIS, LEFT KNEE: ICD-10-CM

## 2025-09-09 PROCEDURE — 99214 OFFICE O/P EST MOD 30 MIN: CPT

## 2025-09-09 RX ORDER — METHYLPREDNISOLONE 4 MG/1
4 TABLET ORAL
Qty: 1 | Refills: 0 | Status: ACTIVE | COMMUNITY
Start: 2025-09-09 | End: 1900-01-01

## 2025-09-19 ENCOUNTER — APPOINTMENT (OUTPATIENT)
Dept: ORTHOPEDIC SURGERY | Facility: CLINIC | Age: 75
End: 2025-09-19
Payer: MEDICARE

## 2025-09-19 VITALS — WEIGHT: 193 LBS | BODY MASS INDEX: 27.32 KG/M2 | HEIGHT: 70.5 IN

## 2025-09-19 DIAGNOSIS — M17.11 UNILATERAL PRIMARY OSTEOARTHRITIS, RIGHT KNEE: ICD-10-CM

## 2025-09-19 PROCEDURE — 99214 OFFICE O/P EST MOD 30 MIN: CPT | Mod: 25

## 2025-09-19 PROCEDURE — 20610 DRAIN/INJ JOINT/BURSA W/O US: CPT | Mod: RT

## (undated) DEVICE — GLV 8.5 PROTEXIS ORTHO (BROWN)

## (undated) DEVICE — ZIMMER PULSAVAC PLUS FAN KIT

## (undated) DEVICE — SUT STRATAFIX SPIRAL MONOCRYL PLUS 4-0 45CM PS-2 UNDYED

## (undated) DEVICE — DRAPE SURGICAL #1010

## (undated) DEVICE — DRSG DERMABOND PRINEO 22CM

## (undated) DEVICE — MAKO VIZADISC KNEE TRACKING KIT

## (undated) DEVICE — HOOD T5 PEELAWAY

## (undated) DEVICE — MEDICATION LABELS W MARKER

## (undated) DEVICE — GLV 8.5 PROTEXIS (WHITE)

## (undated) DEVICE — MAKO DRAPE KIT

## (undated) DEVICE — MAKO BLADE NARROW

## (undated) DEVICE — GOWN IMPERV BREATHABLE XL

## (undated) DEVICE — POSITIONER FOAM BUMP FLAT TOP 10X6X4" LRG

## (undated) DEVICE — WARMING BLANKET UPPER ADULT

## (undated) DEVICE — SUT STRATAFIX SYMMETRIC PDS PLUS 1 18" CTX VIOLET

## (undated) DEVICE — FRA-ESU BOVIE FORCE TRIAD T7J19731DX: Type: DURABLE MEDICAL EQUIPMENT

## (undated) DEVICE — ELCTR STRYKER NEPTUNE SMOKE EVACUATION PENCIL (GREEN)

## (undated) DEVICE — NDL HYPO SAFE 22G X 1.5" (BLACK)

## (undated) DEVICE — ELCTR GROUNDING PAD ADULT COVIDIEN

## (undated) DEVICE — SUT VICRYL 1 36" CT-1 UNDYED

## (undated) DEVICE — MIXER BONE CEMENT EVAC III

## (undated) DEVICE — SAW BLADE STRYKER SAGITTAL 25X0.89X75MM

## (undated) DEVICE — HOOD FLYTE STRYKER HELMET SHIELD

## (undated) DEVICE — SYR LUER LOK 20CC

## (undated) DEVICE — DRSG ACE BANDAGE 6"

## (undated) DEVICE — PACK TOTAL KNEE

## (undated) DEVICE — VENODYNE/SCD SLEEVE CALF MEDIUM

## (undated) DEVICE — SOL IRR BAG NS 0.9% 3000ML

## (undated) DEVICE — TOURNIQUET ESMARK 6"

## (undated) DEVICE — MAKO BLADE STANDARD

## (undated) DEVICE — SUCTION TIP KAMVAC MINI

## (undated) DEVICE — CRYO/CUFF GRAVITY COOLER KNEE LARGE

## (undated) DEVICE — PREP SCRUB BRUSH W CHG 4%

## (undated) DEVICE — DRSG TELFA 3 X 8

## (undated) DEVICE — SUT STRATAFIX SPIRAL PDS PLUS 2-0 45CM CT-1

## (undated) DEVICE — SYR ASEPTO

## (undated) DEVICE — DRSG COBAN 6"

## (undated) DEVICE — MAKO CHECKPOINT KIT FEMORAL / TIBIAL